# Patient Record
Sex: FEMALE | Race: WHITE | NOT HISPANIC OR LATINO | Employment: FULL TIME | URBAN - METROPOLITAN AREA
[De-identification: names, ages, dates, MRNs, and addresses within clinical notes are randomized per-mention and may not be internally consistent; named-entity substitution may affect disease eponyms.]

---

## 2017-01-03 ENCOUNTER — TELEPHONE (OUTPATIENT)
Dept: OBSTETRICS AND GYNECOLOGY | Facility: CLINIC | Age: 27
End: 2017-01-03

## 2017-01-03 NOTE — TELEPHONE ENCOUNTER
----- Message from Isi Sanchez sent at 1/3/2017 10:27 AM CST -----  Contact: self  Patient has appointment on 01/11/17 but needs a refill on Lo Lo Estren. Please call in to [facility] pharmacy at [phone]. Please call patient at [phone]. Thanks!  [id number] - [place], [place] - [address] [facility]  [address] [facility] [address]  Phone: [phone] Fax: [phone]

## 2017-01-11 ENCOUNTER — OFFICE VISIT (OUTPATIENT)
Dept: OBSTETRICS AND GYNECOLOGY | Facility: CLINIC | Age: 27
End: 2017-01-11
Payer: COMMERCIAL

## 2017-01-11 VITALS
HEIGHT: 62 IN | WEIGHT: 131.63 LBS | SYSTOLIC BLOOD PRESSURE: 110 MMHG | DIASTOLIC BLOOD PRESSURE: 74 MMHG | BODY MASS INDEX: 24.22 KG/M2

## 2017-01-11 DIAGNOSIS — Z01.419 ROUTINE GYNECOLOGICAL EXAMINATION: Primary | ICD-10-CM

## 2017-01-11 PROCEDURE — 99999 PR PBB SHADOW E&M-EST. PATIENT-LVL III: CPT | Mod: PBBFAC,,, | Performed by: SPECIALIST

## 2017-01-11 PROCEDURE — 99395 PREV VISIT EST AGE 18-39: CPT | Mod: S$GLB,,, | Performed by: SPECIALIST

## 2017-01-11 PROCEDURE — 88175 CYTOPATH C/V AUTO FLUID REDO: CPT

## 2017-01-11 NOTE — MR AVS SNAPSHOT
"    [facility]  [address] [place] [address]  Phone: [phone]                  Kaci Moore   2017 1:00 PM   Office Visit    Description:  Female : 1990   Provider:  Patricio Choudhary MD   Department:  [facility] - OB/GYN           Reason for Visit     Well Woman           Diagnoses this Visit        Comments    Routine gynecological examination    -  Primary            To Do List           Future Appointments        Provider Department Dept Phone    2017 1:00 PM Patricio Choudhary MD [facility] [phone]      Goals (5 Years of Data)     None      [facility] On [facility]     [facility] On Call Nurse Care Line -  Assistance  Registered nurses in the [facility] On Call Center provide clinical advisement, health education, appointment booking, and other advisory services.  Call for this free service at [phone].             Medications           Message regarding Medications     Verify the changes and/or additions to your medication regime listed below are the same as discussed with your clinician today.  If any of these changes or additions are incorrect, please notify your healthcare provider.             Verify that the below list of medications is an accurate representation of the medications you are currently taking.  If none reported, the list may be blank. If incorrect, please contact your healthcare provider. Carry this list with you in case of emergency.           Current Medications     norethindrone-e.estradiol-iron (LO LOESTRIN FE) 1 mg-10 mcg (24)/10 mcg (2) Tab Take 1 tablet by mouth once daily.           Clinical Reference Information           Vital Signs - Last Recorded  Most recent update: 2017 12:57 PM by Thierry Grajeda LPN    BP Ht Wt LMP BMI    110/74 5' 2" (1.575 m) 59.7 kg (131 lb 9.8 oz) 2016 (Exact Date) 24.07 kg/m2      Blood Pressure          Most Recent Value    BP  110/74      Allergies as of 2017  "    No Known Allergies      Immunizations Administered on Date of Encounter - 1/11/2017     None      Orders Placed During Today's Visit      Normal Orders This Visit    Liquid-based pap smear, screening

## 2017-01-11 NOTE — PROGRESS NOTES
27 yo WF presents for annual gyn evaluation  Past Medical History   Diagnosis Date    Abnormal Pap smear     Abnormal Pap smear of vagina        History reviewed. No pertinent past surgical history.    Family History   Problem Relation Age of Onset    Breast cancer Maternal Grandmother     Ovarian cancer Neg Hx        Social History     Social History    Marital status: Single     Spouse name: N/A    Number of children: N/A    Years of education: N/A     Social History Main Topics    Smoking status: Former Smoker     Packs/day: 0.25     Years: 2.00     Quit date: 10/21/2015    Smokeless tobacco: Former User     Quit date: 1/2/2013    Alcohol use No    Drug use: No    Sexual activity: Yes     Partners: Male     Birth control/ protection: Condom, OCP     Other Topics Concern    None     Social History Narrative       Current Outpatient Prescriptions   Medication Sig Dispense Refill    norethindrone-e.estradiol-iron (LO LOESTRIN FE) 1 mg-10 mcg (24)/10 mcg (2) Tab Take 1 tablet by mouth once daily. 28 tablet 0     No current facility-administered medications for this visit.        Review of patient's allergies indicates:  No Known Allergies    Review of System:   General: no chills, fever, night sweats, weight gain or weight loss  Psychological: no depression or suicidal ideation  Breasts: no new or changing breast lumps, nipple discharge or masses.  Respiratory: no cough, shortness of breath, or wheezing  Cardiovascular: no chest pain or dyspnea on exertion  Gastrointestinal: no abdominal pain, change in bowel habits, or black or bloody stools  Genito-Urinary: no incontinence, urinary frequency/urgency or vulvar/vaginal symptoms, pelvic pain or abnormal vaginal bleeding.  Musculoskeletal: no gait disturbance or muscular weakness    General Appearance:  Alert, cooperative, no distress, appears stated age   Head:  Normocephalic, without obvious abnormality, atraumatic   Eyes:  PERRL, conjunctiva/corneas  clear, EOM's intact, fundi benign, both eyes   Ears:  Normal TM's and external ear canals, both ears   Nose: Nares normal, septum midline,mucosa normal, no drainage or sinus tenderness   Throat: Lips, mucosa, and tongue normal; teeth and gums normal   Neck: Supple, symmetrical, trachea midline, no adenopathy;  thyroid: not enlarged, symmetric, no tenderness/mass/nodules; no carotid bruit or JVD   Back:   Symmetric, no curvature, ROM normal, no CVA tenderness   Lungs:   Clear to auscultation bilaterally, respirations unlabored   Breasts:  No masses or tenderness   Heart:  Regular rate and rhythm, S1 and S2 normal, no murmur, rub, or gallop   Abdomen:   Soft, non-tender, bowel sounds active all four quadrants,  no masses, no organomegaly    Genitourinary:   External rectal exam shows no thrombosed external hemorrhoids.   Pelvic exam was performed with patient supine.  No labial fusion.  There is no rash, lesion or injury on the right labia.  There is no rash, lesion or injury on the left labia.  No bleeding and no signs of injury around the vaginal introitus, urethra is without lesions and well supported. The cervix is visualized with no discharge, lesions or friability.  No vaginal discharge found.  No significant Cystocele, Enterocele or rectocele, and uterus well supported.  Bimanual exam:  The urethra is normal to palpation and there are no palpable vaginal wall masses.  Uterus is not deviated, not enlarged, not fixed, normal shape and not tender.  Cervix exhibits no motion tenderness.   Right adnexum displays no mass and no tenderness.  Left adnexum displays no mass and no tenderness.   Extremities: Extremities normal, atraumatic, no cyanosis or edema   Pulses: 2+ and symmetric   Skin: Skin color, texture, turgor normal, no rashes or lesions   Lymph nodes: Cervical, supraclavicular, and axillary nodes normal   Neurologic: Normal       PAP submitted    Plan Refill OCPs   RTO 1 year/prn

## 2018-01-02 RX ORDER — NORETHINDRONE ACETATE AND ETHINYL ESTRADIOL, ETHINYL ESTRADIOL AND FERROUS FUMARATE 1MG-10(24)
KIT ORAL
Qty: 28 TABLET | Refills: 11 | Status: SHIPPED | OUTPATIENT
Start: 2018-01-02 | End: 2019-01-22 | Stop reason: SDUPTHER

## 2018-01-19 DIAGNOSIS — Z30.9 ENCOUNTER FOR CONTRACEPTIVE MANAGEMENT, UNSPECIFIED TYPE: Primary | ICD-10-CM

## 2018-01-22 ENCOUNTER — TELEPHONE (OUTPATIENT)
Dept: OBSTETRICS AND GYNECOLOGY | Facility: CLINIC | Age: 28
End: 2018-01-22

## 2018-01-22 DIAGNOSIS — Z30.9 ENCOUNTER FOR CONTRACEPTIVE MANAGEMENT, UNSPECIFIED TYPE: Primary | ICD-10-CM

## 2018-01-22 RX ORDER — NORETHINDRONE ACETATE AND ETHINYL ESTRADIOL .02; 1 MG/1; MG/1
1 TABLET ORAL DAILY
Qty: 30 TABLET | Refills: 0 | Status: SHIPPED | OUTPATIENT
Start: 2018-01-22 | End: 2018-01-22 | Stop reason: RX

## 2018-01-22 NOTE — TELEPHONE ENCOUNTER
----- Message from Leah De Dios sent at 1/22/2018  2:54 PM CST -----  Contact: Patient  The pharmacist has told the patient that her birth control has been changed from the  LoLo Estrin SE 1mg/10 mcg tablets to the Norethindrone-ethinyl estradiol (MICROGESTIN 1/20) 1-20 mg- and the patient wants to know why.  She had stated the reason she was taking the LoLo Estrin SE is because of the iron intake to help her menstrual.  Can you please call the patient back at [phone].  Thank you      [facility] - [place] - [address]  [address] [place] [address]  Phone: [phone] Fax: [phone]

## 2018-01-22 NOTE — TELEPHONE ENCOUNTER
saranya made for [facility].  In the meantime, insurance is no longer covering her LoLo.  Has pt tried anything else?  Does pt need to be specifically on the lolo

## 2018-01-22 NOTE — TELEPHONE ENCOUNTER
----- Message from Aisha Esteves sent at 1/22/2018  2:44 PM CST -----  Contact: [facility]  Pharmacy called regarding the patient's birth control changed from norethindrone-ethinyl estradiol (MICROGESTIN 1/20) 1-20 mg-mcg per tablet to something else. Waned to clarify change because previous had iron and new one doesn't. Please contact Charan    [facility] - [place] - [address]  [address] [place] [address]  Phone: [phone] Fax: [phone]

## 2018-01-24 ENCOUNTER — OFFICE VISIT (OUTPATIENT)
Dept: OBSTETRICS AND GYNECOLOGY | Facility: CLINIC | Age: 28
End: 2018-01-24
Payer: COMMERCIAL

## 2018-01-24 VITALS — BODY MASS INDEX: 22.3 KG/M2 | WEIGHT: 125.88 LBS | HEIGHT: 63 IN

## 2018-01-24 DIAGNOSIS — Z01.419 GYNECOLOGIC EXAM NORMAL: Primary | ICD-10-CM

## 2018-01-24 DIAGNOSIS — Z30.011 ENCOUNTER FOR INITIAL PRESCRIPTION OF CONTRACEPTIVE PILLS: ICD-10-CM

## 2018-01-24 PROCEDURE — 99395 PREV VISIT EST AGE 18-39: CPT | Mod: S$GLB,,, | Performed by: SPECIALIST

## 2018-01-24 PROCEDURE — 88175 CYTOPATH C/V AUTO FLUID REDO: CPT

## 2018-01-24 PROCEDURE — 87624 HPV HI-RISK TYP POOLED RSLT: CPT

## 2018-01-24 PROCEDURE — 99999 PR PBB SHADOW E&M-EST. PATIENT-LVL III: CPT | Mod: PBBFAC,,, | Performed by: SPECIALIST

## 2018-01-24 NOTE — PROGRESS NOTES
26 yo WF presents for annual gyn evaluation and contraceptive management.  Past Medical History:   Diagnosis Date    Abnormal Pap smear     Abnormal Pap smear of vagina     Anemia     Hyperglycemia        Past Surgical History:   Procedure Laterality Date    COLONOSCOPY  06/30/2017    F [place]    CYST REMOVAL  2013    left wrist-benign    TONSILLECTOMY  1998    WISDOM TOOTH EXTRACTION  2009       Family History   Problem Relation Age of Onset    Breast cancer Maternal Grandmother     Diabetes Maternal Grandmother     ADD / ADHD Mother     Bipolar disorder Mother     Allergies Mother     Diabetes Maternal Grandfather     No Known Problems Father     Ovarian cancer Neg Hx        Social History     Social History    Marital status: Single     Spouse name: N/A    Number of children: N/A    Years of education: N/A     Social History Main Topics    Smoking status: Former Smoker     Packs/day: 0.25     Years: 2.00     Quit date: 10/21/2015    Smokeless tobacco: Never Used    Alcohol use No    Drug use: No    Sexual activity: Yes     Partners: Male     Birth control/ protection: Condom, OCP     Other Topics Concern    None     Social History Narrative    None       Current Outpatient Prescriptions   Medication Sig Dispense Refill    acetaminophen (TYLENOL) 500 MG tablet Take 500 mg by mouth as needed for Pain.      linaclotide (LINZESS) 72 mcg Cap Take 72 mcg by mouth once daily.      LO LOESTRIN FE 1 mg-10 mcg (24)/10 mcg (2) Tab TAKE ONE TABLET BY MOUTH ONCE DAILY 28 tablet 11    multivitamin with minerals tablet Take 1 tablet by mouth once daily.       No current facility-administered medications for this visit.        Review of patient's allergies indicates:  No Known Allergies    Review of System:   General: no chills, fever, night sweats, weight gain or weight loss  Psychological: no depression or suicidal ideation  Breasts: no new or changing breast lumps, nipple discharge or  masses.  Respiratory: no cough, shortness of breath, or wheezing  Cardiovascular: no chest pain or dyspnea on exertion  Gastrointestinal: no abdominal pain, change in bowel habits, or black or bloody stools  Genito-Urinary: no incontinence, urinary frequency/urgency or vulvar/vaginal symptoms, pelvic pain or abnormal vaginal bleeding.  Musculoskeletal: no gait disturbance or muscular weakness    General Appearance:  Alert, cooperative, no distress, appears stated age   Head:  Normocephalic, without obvious abnormality, atraumatic   Eyes:  PERRL, conjunctiva/corneas clear, EOM's intact, fundi benign, both eyes   Ears:  Normal TM's and external ear canals, both ears   Nose: Nares normal, septum midline,mucosa normal, no drainage or sinus tenderness   Throat: Lips, mucosa, and tongue normal; teeth and gums normal   Neck: Supple, symmetrical, trachea midline, no adenopathy;  thyroid: not enlarged, symmetric, no tenderness/mass/nodules; no carotid bruit or JVD   Back:   Symmetric, no curvature, ROM normal, no CVA tenderness   Lungs:   Clear to auscultation bilaterally, respirations unlabored   Breasts:  No masses or tenderness   Heart:  Regular rate and rhythm, S1 and S2 normal, no murmur, rub, or gallop   Abdomen:   Soft, non-tender, bowel sounds active all four quadrants,  no masses, no organomegaly    Genitourinary:   External rectal exam shows no thrombosed external hemorrhoids.   Pelvic exam was performed with patient supine.  No labial fusion.  There is no rash, lesion or injury on the right labia.  There is no rash, lesion or injury on the left labia.  No bleeding and no signs of injury around the vaginal introitus, urethra is without lesions and well supported. The cervix is visualized with no discharge, lesions or friability.  No vaginal discharge found.  No significant Cystocele, Enterocele or rectocele, and uterus well supported.  Bimanual exam:  The urethra is normal to palpation and there are no palpable  vaginal wall masses.  Uterus is not deviated, not enlarged, not fixed, normal shape and not tender.  Cervix exhibits no motion tenderness.   Right adnexum displays no mass and no tenderness.  Left adnexum displays no mass and no tenderness.   Extremities: Extremities normal, atraumatic, no cyanosis or edema   Pulses: 2+ and symmetric   Skin: Skin color, texture, turgor normal, no rashes or lesions   Lymph nodes: Cervical, supraclavicular, and axillary nodes normal   Neurologic: Normal       PAP submitted    Plan Refill LoLo estrin  Coupon given  RTO 1 year/prn

## 2018-01-29 LAB
HPV16 AG SPEC QL: NEGATIVE
HPV16+18+H RISK 12 DNA CVX-IMP: NEGATIVE
HPV18 DNA SPEC QL NAA+PROBE: NEGATIVE

## 2018-02-05 ENCOUNTER — PATIENT MESSAGE (OUTPATIENT)
Dept: OBSTETRICS AND GYNECOLOGY | Facility: CLINIC | Age: 28
End: 2018-02-05

## 2018-02-14 ENCOUNTER — TELEPHONE (OUTPATIENT)
Dept: OBSTETRICS AND GYNECOLOGY | Facility: CLINIC | Age: 28
End: 2018-02-14

## 2018-02-14 NOTE — TELEPHONE ENCOUNTER
Patient was on lolo, but insurance quit covering it.  We changed her to loestrin. But having these issues.    Okay to try for PA?

## 2018-02-14 NOTE — TELEPHONE ENCOUNTER
----- Message from Imelda Jean Baptiste sent at 2/14/2018 11:27 AM CST -----  Contact: Kaci  Patient is calling as on Wednesday 2/7/18 headaches started, Friday 2/9/18 started spotting with tenderness on breast and nipples, Tuesday 2/13/18 started bleeding with changing pad every hour to hour and a half, and today nipples are swollen and purple. Asking to be switch back to previous Rx but will have to be authorized as insurance will not cover. Please call [phone] (in clinic herself and if no answer can send message via [facility]). Thanks!

## 2018-02-21 ENCOUNTER — PATIENT MESSAGE (OUTPATIENT)
Dept: OBSTETRICS AND GYNECOLOGY | Facility: CLINIC | Age: 28
End: 2018-02-21

## 2018-02-22 ENCOUNTER — PATIENT MESSAGE (OUTPATIENT)
Dept: OBSTETRICS AND GYNECOLOGY | Facility: CLINIC | Age: 28
End: 2018-02-22

## 2018-03-30 PROBLEM — G93.32 CHRONIC FATIGUE SYNDROME: Status: ACTIVE | Noted: 2018-03-30

## 2018-03-30 PROBLEM — Z83.49 FAMILY HISTORY OF PITUITARY DISEASE: Status: ACTIVE | Noted: 2018-03-30

## 2018-03-30 PROBLEM — G47.00 INSOMNIA: Status: ACTIVE | Noted: 2018-03-30

## 2018-04-09 ENCOUNTER — TELEPHONE (OUTPATIENT)
Dept: ENDOCRINOLOGY | Facility: CLINIC | Age: 28
End: 2018-04-09

## 2018-04-09 NOTE — TELEPHONE ENCOUNTER
----- Message from Brenda Syed sent at 4/9/2018  8:10 AM CDT -----  Patient has appointment on May 11th/stated had abnormal MRI and needs to be seen sooner/please call patient back at [phone] to reschedule or advise.

## 2018-04-09 NOTE — TELEPHONE ENCOUNTER
Spoke with pt, states she had an MRI on 4/6 which results were abnormal. Pt concerned appt is a month away.    Dr Andersen please advise if pt can wait till May 11th or been seen on a Wednesday

## 2018-04-09 NOTE — TELEPHONE ENCOUNTER
I dont know if we have anything sooner. Can be placed on waitlist.     May also want to try [facility]. I think they have availability pretty soon. They have 2 Endocriologists there. . Also can try [facility]

## 2018-05-11 ENCOUNTER — OFFICE VISIT (OUTPATIENT)
Dept: ENDOCRINOLOGY | Facility: CLINIC | Age: 28
End: 2018-05-11
Payer: COMMERCIAL

## 2018-05-11 ENCOUNTER — LAB VISIT (OUTPATIENT)
Dept: LAB | Facility: HOSPITAL | Age: 28
End: 2018-05-11
Attending: INTERNAL MEDICINE
Payer: COMMERCIAL

## 2018-05-11 VITALS
HEIGHT: 62 IN | HEART RATE: 73 BPM | WEIGHT: 125.81 LBS | DIASTOLIC BLOOD PRESSURE: 60 MMHG | BODY MASS INDEX: 23.15 KG/M2 | SYSTOLIC BLOOD PRESSURE: 104 MMHG

## 2018-05-11 DIAGNOSIS — D35.2 PITUITARY MICROADENOMA: ICD-10-CM

## 2018-05-11 DIAGNOSIS — D35.2 PITUITARY MICROADENOMA: Primary | ICD-10-CM

## 2018-05-11 DIAGNOSIS — R53.83 FATIGUE, UNSPECIFIED TYPE: ICD-10-CM

## 2018-05-11 LAB
CORTIS SERPL-MCNC: 8 UG/DL
PROLACTIN SERPL IA-MCNC: 14.5 NG/ML
T3 SERPL-MCNC: 147 NG/DL
T4 FREE SERPL-MCNC: 1.16 NG/DL
THYROPEROXIDASE IGG SERPL-ACNC: <6 IU/ML
TSH SERPL DL<=0.005 MIU/L-ACNC: 3.39 UIU/ML

## 2018-05-11 PROCEDURE — 36415 COLL VENOUS BLD VENIPUNCTURE: CPT | Mod: PO

## 2018-05-11 PROCEDURE — 86376 MICROSOMAL ANTIBODY EACH: CPT

## 2018-05-11 PROCEDURE — 84305 ASSAY OF SOMATOMEDIN: CPT

## 2018-05-11 PROCEDURE — 99999 PR PBB SHADOW E&M-EST. PATIENT-LVL III: CPT | Mod: PBBFAC,,, | Performed by: INTERNAL MEDICINE

## 2018-05-11 PROCEDURE — 84439 ASSAY OF FREE THYROXINE: CPT

## 2018-05-11 PROCEDURE — 3008F BODY MASS INDEX DOCD: CPT | Mod: CPTII,S$GLB,, | Performed by: INTERNAL MEDICINE

## 2018-05-11 PROCEDURE — 84480 ASSAY TRIIODOTHYRONINE (T3): CPT

## 2018-05-11 PROCEDURE — 82024 ASSAY OF ACTH: CPT

## 2018-05-11 PROCEDURE — 99204 OFFICE O/P NEW MOD 45 MIN: CPT | Mod: S$GLB,,, | Performed by: INTERNAL MEDICINE

## 2018-05-11 PROCEDURE — 82533 TOTAL CORTISOL: CPT

## 2018-05-11 PROCEDURE — 84443 ASSAY THYROID STIM HORMONE: CPT

## 2018-05-11 PROCEDURE — 84146 ASSAY OF PROLACTIN: CPT

## 2018-05-11 RX ORDER — DIPHENHYDRAMINE HCL 25 MG
25 CAPSULE ORAL EVERY 8 HOURS PRN
COMMUNITY
End: 2018-12-26

## 2018-05-11 NOTE — PROGRESS NOTES
CHIEF COMPLAINT: Pituitary Adenoma  27 year old being seen as a new patient. States that a year ago started having an increase in fatigue. Has had fatigue over the past 6 years. States that she sleeps 8 hours. Exercises and eats healthy. States appetite fluctuates. Either gets very hungry or no appetite. Weight fluctuates. States that she has hair loss. Has decreased libido. Has been on OCP x 5 years. States that often times feels cold. No diarrhea or constipation. States that gets anxious very easily. No supplements. No adrenal or thyroid supplements. No biotin.       PAST MEDICAL HISTORY/PAST SURGICAL HISTORY:  Reviewed in [facility]    SOCIAL HISTORY: No T/A. 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 + DM.     MEDICATIONS/ALLERGIES: The patient's MedCard has been updated and reviewed.      ROS:   Constitutional: No recent significant weight change  Eyes: No recent visual changes  ENT: No dysphagia  Cardiovascular: Denies current anginal symptoms  Respiratory: Denies current respiratory difficulty  Gastrointestinal: No N/V  GenitoUrinary - No dysuria  Skin: No new skin rash  Neurologic: + Migraines  Remainder ROS negative        PE:    GENERAL: Well developed, well nourished.  PSYCH:  appropriate mood and affect  EYES:  PERRL, EOM intact.  ENT: Nares patent, oropharynx clear, mucosa pink,   NECK: Supple, trachea midline, no palpable thyroid nodules.   CHEST: Resp even and unlabored, CTA bilateral.  CARDIAC: RRR, S1, S2 heard, no murmurs, rubs, S3, or S4  ABDOMEN: Soft, non-tender, non-distended;  No organomegaly  VASCULAR:  DP pulses +2/4 bilaterally, no edema  NEURO: Gait steady, CN II-VII grossly intact  SKIN: No areas of breakdown, no acanthosis nigracans.    LABS   Results for NACHO LIVINGSTON (MRN [id number]) as of 5/11/2018 08:07   Ref. Range 3/31/2018 07:25   Cortisol -8 AM Latest Ref Range: 4.30 - 22.40 ug/dL 25.0 (H)   ACTH Latest Ref Range: 0 - 46 pg/mL 30   Insulin-Like GF-1 Latest  Ref Range: 98 - 305 ng/mL 301   Results for NACHO LIVINGSTON (MRN [id number]) as of 5/11/2018 08:07   Ref. Range 3/31/2018 07:25   FSH Latest Ref Range: See Text mIU/mL 6.90   LH Latest Ref Range: See Text mIU/mL 6.9   Prolactin Latest Ref Range: 5.2 - 26.5 ng/mL 15.1         3/27/18  Cortisol 27.5  Testosterone < 12  DHEAS 54  Progesterone 0.43  Estrogen 28.8    3/22/18  TSH 0.612  FT4 1.14  FT3 2.6    4/24/18  FT3 4.8  TSH 3.86  Ft4 1.44    MRI BRAIN  MRI of the pituitary both before and after IV contrast    Clinical history chronic fatigue, insomnia.    Patient was injected with 15 cc of gadolinium.    There is a area of abnormal enhancement noted in the posterior aspect of the 2 enteric most consistent with an  adenoma. It measures 7.9 x 5.6 x 5.4 mm.    The ventricles are of normal size and shape. No other lesions are seen. There retention cyst in both maxillary sinuses are normal flow-void seen in the carotid siphons.   Impression      Lesion most consistent with a pituitary adenoma in the posterior aspect of the pituitary as described above.         ASSESSMENT/PLAN:  1. Pituitary Adenoma- Will assess pituitary function. Will r/o hormonal excess    2. Fatigue- see labs below to see if endocrine cause of fatigue. Appears to be sleeping well.       FOLLOWUP  8 AM TSH, Ft4, T3, TPO, Prolactin, IGF-1, ACTH, Cortisol  24 hour urine cortisol

## 2018-05-14 ENCOUNTER — PATIENT MESSAGE (OUTPATIENT)
Dept: ENDOCRINOLOGY | Facility: CLINIC | Age: 28
End: 2018-05-14

## 2018-05-14 LAB
IGF-I SERPL-MCNC: 188 NG/ML (ref 66–303)
IGF-I Z-SCORE SERPL: 0.52 SD

## 2018-05-15 LAB — ACTH PLAS-MCNC: 17 PG/ML

## 2018-05-21 ENCOUNTER — LAB VISIT (OUTPATIENT)
Dept: LAB | Facility: HOSPITAL | Age: 28
End: 2018-05-21
Attending: INTERNAL MEDICINE
Payer: COMMERCIAL

## 2018-05-21 DIAGNOSIS — D35.2 PITUITARY MICROADENOMA: ICD-10-CM

## 2018-05-21 PROCEDURE — 82530 CORTISOL FREE: CPT

## 2018-05-23 LAB
COLLECT DURATION TIME UR: 24 H
CORTIS 24H UR-MRATE: 7.5 MCG/24 H (ref 3.5–45)
SPECIMEN VOL ?TM UR: 1875 ML

## 2018-08-01 ENCOUNTER — PATIENT MESSAGE (OUTPATIENT)
Dept: ENDOCRINOLOGY | Facility: CLINIC | Age: 28
End: 2018-08-01

## 2018-08-01 DIAGNOSIS — D35.2 PITUITARY ADENOMA: Primary | ICD-10-CM

## 2018-08-15 ENCOUNTER — TELEPHONE (OUTPATIENT)
Dept: ENDOCRINOLOGY | Facility: CLINIC | Age: 28
End: 2018-08-15

## 2018-08-15 NOTE — TELEPHONE ENCOUNTER
Let her know I reviewed labs  [facility] 8/13/18  TSH 1.8  FT4 1.23  FSH 7.8  LH 11.4    Let her know her thyroid levels are normal. Also checked 2 pituitary hormone which are normal as well

## 2018-09-05 ENCOUNTER — PATIENT MESSAGE (OUTPATIENT)
Dept: OBSTETRICS AND GYNECOLOGY | Facility: CLINIC | Age: 28
End: 2018-09-05

## 2018-12-28 PROBLEM — J01.41 ACUTE RECURRENT PANSINUSITIS: Status: ACTIVE | Noted: 2018-12-28

## 2019-01-22 RX ORDER — NORETHINDRONE ACETATE AND ETHINYL ESTRADIOL, ETHINYL ESTRADIOL AND FERROUS FUMARATE 1MG-10(24)
KIT ORAL
Qty: 28 TABLET | Refills: 0 | Status: SHIPPED | OUTPATIENT
Start: 2019-01-22 | End: 2019-02-19 | Stop reason: SDUPTHER

## 2019-02-20 RX ORDER — NORETHINDRONE ACETATE AND ETHINYL ESTRADIOL, ETHINYL ESTRADIOL AND FERROUS FUMARATE 1MG-10(24)
KIT ORAL
Qty: 28 TABLET | Refills: 0 | Status: SHIPPED | OUTPATIENT
Start: 2019-02-20 | End: 2019-05-06

## 2019-03-29 PROBLEM — D35.2 PITUITARY ADENOMA: Status: ACTIVE | Noted: 2019-03-29

## 2019-03-30 ENCOUNTER — TELEPHONE (OUTPATIENT)
Dept: ENDOCRINOLOGY | Facility: CLINIC | Age: 29
End: 2019-03-30

## 2019-04-01 PROBLEM — J01.41 ACUTE RECURRENT PANSINUSITIS: Status: RESOLVED | Noted: 2018-12-28 | Resolved: 2019-04-01

## 2019-07-11 PROBLEM — G43.111 INTRACTABLE MIGRAINE WITH AURA WITH STATUS MIGRAINOSUS: Status: ACTIVE | Noted: 2019-07-11

## 2019-08-24 ENCOUNTER — OFFICE VISIT (OUTPATIENT)
Dept: URGENT CARE | Facility: CLINIC | Age: 29
End: 2019-08-24
Payer: COMMERCIAL

## 2019-08-24 VITALS
TEMPERATURE: 98 F | HEART RATE: 69 BPM | DIASTOLIC BLOOD PRESSURE: 80 MMHG | OXYGEN SATURATION: 100 % | SYSTOLIC BLOOD PRESSURE: 114 MMHG

## 2019-08-24 DIAGNOSIS — S99.912A INJURY OF LEFT ANKLE, INITIAL ENCOUNTER: Primary | ICD-10-CM

## 2019-08-24 PROCEDURE — 99214 PR OFFICE/OUTPT VISIT, EST, LEVL IV, 30-39 MIN: ICD-10-PCS | Mod: S$GLB,,, | Performed by: PHYSICIAN ASSISTANT

## 2019-08-24 PROCEDURE — 73610 X-RAY EXAM OF ANKLE: CPT | Mod: LT,S$GLB,, | Performed by: RADIOLOGY

## 2019-08-24 PROCEDURE — 73610 XR ANKLE COMPLETE 3 VIEW LEFT: ICD-10-PCS | Mod: LT,S$GLB,, | Performed by: RADIOLOGY

## 2019-08-24 PROCEDURE — 99214 OFFICE O/P EST MOD 30 MIN: CPT | Mod: S$GLB,,, | Performed by: PHYSICIAN ASSISTANT

## 2019-08-24 RX ORDER — AZELASTINE 1 MG/ML
2 SPRAY, METERED NASAL 2 TIMES DAILY
Refills: 5 | COMMUNITY
Start: 2019-07-16 | End: 2021-06-04

## 2019-08-24 NOTE — PATIENT INSTRUCTIONS
Treating Strains and Sprains  Strains and sprains happen when muscles or other soft tissues near your bones stretch or tear. These injuries can cause bruising, swelling, and pain. To ease your discomfort and speed the healing of your strain or sprain, follow the tips below. Remember, a strain or sprain can take 6 to 8 weeks to heal.     Important Note: Do not give aspirin to children or teens without discussing it with your healthcare provider first.        Ice first, heat later  · Use ice for the first 24 to 48 hours after injury. Ice helps prevent swelling and reduce pain. Ice the injury for no more than 20 minutes at a time and allow at least  20 minutes between icing sessions.  · Apply heat after the first 72 hours, once the swelling has gone down. Heat relaxes muscles and increases blood flow. Soak the injured area in warm water or use a heating pad set on low for no more than 15 minutes at a time.  Wrap and elevate  · Wrap an injured limb firmly with an elastic bandage. This provides support and helps prevent swelling. Dont wear an elastic bandage overnight. Watch for tingling, numbness, or increased pain, and remove the bandage immediately if any of these occurs.  · Elevate the injured area to help reduce swelling and throbbing. Its best to raise an injured limb above the level of your heart.     Medicines  · Over-the-counter medicines such as acetaminophen or ibuprofen can help reduce pain. Some also help reduce swelling.  · Take medicine only as directed.  · Rest the area even if medicines are controlling the pain.  Rest  · Rest the injured area by not using it for 24 hours.  · When youre ready, return slowly to your normal activities. Rest the injured area often.  · Dont use or walk on an injured limb if it hurts.  Date Last Reviewed: 9/3/2015  © 3656-5852 [employer]. [address] [place] [address], [place] [address]. All rights reserved. This information is not intended as a substitute for  professional medical care. Always follow your healthcare professional's instructions.

## 2019-08-24 NOTE — PROGRESS NOTES
Subjective:       Patient ID: Kaci Moore is a 28 y.o. female.    Vitals:  oral temperature is 98 °F (36.7 °C). Her blood pressure is 114/80 and her pulse is 69. Her oxygen saturation is 100%.     Chief Complaint: Ankle Injury    Ankle Injury    The incident occurred 3 to 5 days ago. [place] The injury mechanism was a twisting injury. The pain is present in the left ankle. The pain is at a severity of 3/10. The pain is moderate. The pain has been fluctuating since onset. Pertinent negatives include no numbness. She reports no foreign bodies present. The symptoms are aggravated by movement and weight bearing. She has tried ice and immobilization for the symptoms. The treatment provided mild relief.       Constitution: Negative for chills, fatigue and fever.   HENT: Negative for congestion and sore throat.    Neck: Negative for painful lymph nodes.   Cardiovascular: Negative for chest pain and leg swelling.   Eyes: Negative for double vision and blurred vision.   Respiratory: Negative for cough and shortness of breath.    Gastrointestinal: Negative for nausea, vomiting and diarrhea.   Genitourinary: Negative for dysuria, frequency, urgency and history of kidney stones.   Musculoskeletal: Positive for joint pain and joint swelling. Negative for muscle cramps and muscle ache.   Skin: Negative for color change, pale, rash, erythema and bruising.   Allergic/Immunologic: Negative for seasonal allergies.   Neurological: Negative for dizziness, history of vertigo, light-headedness, passing out, headaches and numbness.   Hematologic/Lymphatic: Negative for swollen lymph nodes.   Psychiatric/Behavioral: Negative for nervous/anxious, sleep disturbance and depression. The patient is not nervous/anxious.        Objective:      Physical Exam   Constitutional: She is oriented to person, place, and time. She appears well-developed and well-nourished. No distress.   HENT:   Head:  Normocephalic and atraumatic.   Right Ear: External ear normal.   Left Ear: External ear normal.   Nose: Nose normal.   Eyes: Conjunctivae, EOM and lids are normal.   Neck: Trachea normal, full passive range of motion without pain and phonation normal. Neck supple.   Musculoskeletal: Normal range of motion.        Left ankle: She exhibits swelling (Mild). She exhibits normal range of motion, no deformity and normal pulse. Tenderness. AITFL tenderness found.   Neurological: She is alert and oriented to person, place, and time.   Skin: Skin is warm, dry and intact. Capillary refill takes less than 2 seconds. No rash noted. She is not diaphoretic. No erythema.   Psychiatric: She has a normal mood and affect. Her speech is normal and behavior is normal. Judgment and thought content normal. Cognition and memory are normal.   Nursing note and vitals reviewed.      Assessment:       1. Injury of left ankle, initial encounter        Plan:         Injury of left ankle, initial encounter  -     XR ANKLE COMPLETE 3 VIEW LEFT; Future; Expected date: 08/24/2019    Xr Ankle Complete 3 View Left    Result Date: 8/24/2019  EXAMINATION: XR ANKLE COMPLETE 3 VIEW LEFT CLINICAL HISTORY: Unspecified injury of left ankle, initial encounter TECHNIQUE: AP, lateral and oblique views of the left ankle were performed. COMPARISON: None FINDINGS: No fracture or dislocation.  Ankle mortise is symmetric.  Talar dome is maintained.  Plantar calcaneal spur and Achilles enthesopathy noted.  Soft tissues are unremarkable.     As above. Electronically signed by: Travis Charles MD Date:    08/24/2019 Time:    12:10      Patient has compression stocking which she has been using.  Would continue to use.  Discussed continue rest ice and elevation.  May use ibuprofen or naproxen as needed for pain. She does have an orthopedist she may follow up with if symptoms do not improve after 2 weeks time.    You must understand that you've received an Urgent Care  treatment only and that you may be released before all your medical problems are known or treated. You, the patient, will arrange for follow up care as instructed.  Follow up with your PCP or specialty clinic as directed in the next 1-2 weeks if not improved or as needed.  You can call [phone] to schedule an appointment with the appropriate provider.  If your condition worsens we recommend that you receive another evaluation at the emergency room immediately or contact your primary medical clinics after hours call service to discuss your concerns.  Please return here or go to the Emergency Department for any concerns or worsening of condition.

## 2020-01-13 ENCOUNTER — OFFICE VISIT (OUTPATIENT)
Dept: URGENT CARE | Facility: CLINIC | Age: 30
End: 2020-01-13
Payer: COMMERCIAL

## 2020-01-13 VITALS
HEIGHT: 62 IN | TEMPERATURE: 100 F | OXYGEN SATURATION: 99 % | BODY MASS INDEX: 25.76 KG/M2 | DIASTOLIC BLOOD PRESSURE: 78 MMHG | WEIGHT: 140 LBS | HEART RATE: 82 BPM | SYSTOLIC BLOOD PRESSURE: 131 MMHG

## 2020-01-13 DIAGNOSIS — R10.9 ABDOMINAL PAIN, UNSPECIFIED ABDOMINAL LOCATION: Primary | ICD-10-CM

## 2020-01-13 DIAGNOSIS — R11.0 NAUSEA: ICD-10-CM

## 2020-01-13 LAB
B-HCG UR QL: NEGATIVE
BILIRUB UR QL STRIP: NEGATIVE
COLOR UR: NORMAL
CTP QC/QA: YES
GLUCOSE UR QL STRIP: NEGATIVE
KETONES UR QL STRIP: NEGATIVE
LEUKOCYTE ESTERASE UR QL STRIP: NEGATIVE
PH, POC UA: 6 (ref 5–8)
POC BLOOD, URINE: NEGATIVE
POC NITRATES, URINE: NEGATIVE
PROT UR QL STRIP: NEGATIVE
SP GR UR STRIP: 1.01 (ref 1–1.03)
UROBILINOGEN UR STRIP-ACNC: NORMAL (ref 0.1–1.1)

## 2020-01-13 PROCEDURE — 81003 POCT URINALYSIS, DIPSTICK, AUTOMATED, W/O SCOPE: ICD-10-PCS | Mod: QW,S$GLB,, | Performed by: PHYSICIAN ASSISTANT

## 2020-01-13 PROCEDURE — 81025 POCT URINE PREGNANCY: ICD-10-PCS | Mod: S$GLB,,, | Performed by: PHYSICIAN ASSISTANT

## 2020-01-13 PROCEDURE — 81003 URINALYSIS AUTO W/O SCOPE: CPT | Mod: QW,S$GLB,, | Performed by: PHYSICIAN ASSISTANT

## 2020-01-13 PROCEDURE — 99214 OFFICE O/P EST MOD 30 MIN: CPT | Mod: 25,S$GLB,, | Performed by: PHYSICIAN ASSISTANT

## 2020-01-13 PROCEDURE — 99214 PR OFFICE/OUTPT VISIT, EST, LEVL IV, 30-39 MIN: ICD-10-PCS | Mod: 25,S$GLB,, | Performed by: PHYSICIAN ASSISTANT

## 2020-01-13 PROCEDURE — 81025 URINE PREGNANCY TEST: CPT | Mod: S$GLB,,, | Performed by: PHYSICIAN ASSISTANT

## 2020-01-13 RX ORDER — ONDANSETRON 4 MG/1
4 TABLET, ORALLY DISINTEGRATING ORAL EVERY 8 HOURS PRN
Qty: 30 TABLET | Refills: 0 | Status: SHIPPED | OUTPATIENT
Start: 2020-01-13 | End: 2020-02-20

## 2020-01-14 NOTE — PATIENT INSTRUCTIONS
If you were prescribed a narcotic or controlled medication, do not drive or operate heavy equipment or machinery while taking these medications.  You must understand that you've received an Urgent Care treatment only and that you may be released before all your medical problems are known or treated. You, the patient, will arrange for follow up care as instructed.  Follow up with your PCP or specialty clinic as directed if not improved or as needed. You can call [phone] to schedule an appointment with the appropriate provider.  If your condition worsens we recommend that you receive another evaluation at the Emergency Department for any concerns or worsening of condition.  Patient aware and verbalized understanding.    Discussed NEGATIVE UA and UPT results with patient.  Rest and hydrate with plenty of fluids.  Recommended very bland diet for the next 24-48 hours.   Avoid spicy foods and fast food/greasy food until symptoms have resolved.  Zofran RX as needed for nausea/vomiting.  OTC Tylenol every 6 hours as needed for headache, fever or pain.  Advised patient to follow-up with PCP and OBGYN for further evaluation as needed.  ER precautions given to patient.  Patient aware and verbalized understanding.    Unknown Causes of Abdominal Pain (Female)    The exact cause of your abdominal (stomach) pain is not clear. This does not mean that this is something to worry about. Everyone likes to know the exact cause of the problem, but sometimes with abdominal pain, there is no clear-cut cause, and this could be a good thing. The good news is that your symptoms can be treated, and you will feel better.   Your condition does not seem serious now; however, sometimes the signs of a serious problem may take more time to appear. For this reason, it is important for you to watch for any new symptoms, problems, or worsening of your condition.  Over the next few days, the abdominal pain may come and go, or be continuous. Other  common symptoms can include nausea and vomiting. Sometimes it can be difficult to tell if you feel nauseous, you may just feel bad and not associate that feeling with nausea. Constipation, diarrhea, and a fever may go along with the pain.  The pain may continue even if treated correctly over the following days. Depending on how things go, sometimes the cause can become clear and may require further or different treatment. Additional evaluations, medications, or tests may also be needed.  Home care  Your healthcare provider may prescribe medicine for pain, symptoms, or an infection.  Follow the healthcare provider's instructions for taking these medicines.  General care  · Rest as much as you can until your next exam. No strenuous activities.  · Try to find positions that ease discomfort. A small pillow placed on the abdomen may help relieve pain.  · Something warm on your abdomen (such as a heating pad) may help, but be careful not to burn yourself.  Diet  · Do not force yourself to eat, especially if having cramps, vomiting, or diarrhea.  · Water is important so you do not get dehydrated. Soup may also be good. Sports drinks may also help, especially if they are not too acidic. Make sure you don't drink sugary drinks as this can make things worse. Take liquids in small amounts. Do not guzzle them.  · Caffeine sometimes makes the pain and cramping worse.  · Avoid dairy products if you have vomiting or diarrhea.  · Don't eat large amounts at a time. Wait a few minutes between bites.  · Eat a diet low in fiber (called a low-residue diet). Foods allowed include refined breads, white rice, fruit and vegetable juices without pulp, tender meats. These foods will pass more easily through the intestine.  · Avoid whole-grain foods, whole fruits and vegetables, meats, seeds and nuts, fried or fatty foods, dairy, alcohol and spicy foods until your symptoms go away.  Follow-up care  Follow up with your healthcare provider, or  as advised, if your pain does not begin to improve in the next 24 hours.  Call 911  Call 911 if any of these occur:  · Trouble breathing  · Confusion  · Fainting or loss of consciousness  · Rapid heart rate  · Seizure  When to seek medical advice  Call your healthcare provider right away if any of these occur:  · Pain gets worse or moves to the right lower abdomen  · New or worsening vomiting or diarrhea  · Swelling of the abdomen  · Unable to pass stool for more than 3 days  · Fever of 100.4ºF (38ºC) or higher, or as directed by your healthcare provider.  · Blood in vomit or bowel movements (dark red or black color)  · Jaundice (yellow color of eyes and skin)  · Weakness, dizziness  · Chest, arm, back, neck or jaw pain  · Unexpected vaginal bleeding or missed period  · Can't keep down liquids or water and are getting dehydrated  Date Last Reviewed: 12/30/2015  © 8215-2264 [employer] [address] [place] [address], [place] [address]. All rights reserved. This information is not intended as a substitute for professional medical care. Always follow your healthcare professional's instructions.

## 2020-01-14 NOTE — PROGRESS NOTES
"Subjective:       Patient ID: Kaci Moore is a 29 y.o. female.    Vitals:  height is 5' 2" (1.575 m) and weight is 63.5 kg (140 lb). Her oral temperature is 99.6 °F (37.6 °C). Her blood pressure is 131/78 and her pulse is 82. Her oxygen saturation is 99%.     Chief Complaint: Abdominal Pain    Patient with PMHx of ovarian cysts presents to urgent care with intermittent RLQ pain x 5 days. Patient reports intermittent nausea with the RLQ pain. Patient denies fever, chills, body aches, active CP, SOB, wheezing, vomiting, diarrhea, constipation, hematuria, vaginal discharge/bleeding, headache, blurry vision, dizziness or syncope. Patient reports that she recently started taking Cymbalta RX. Patient reports that she has follow-up appointment with OBGYN for later this week. Patient denies abdominal surgeries. Patient has tried OTC Tylenol and Heating pad with only temporary relief.     Abdominal Pain   This is a new problem. The current episode started in the past 7 days. The problem occurs intermittently. The problem has been gradually worsening. The pain is located in the RLQ. The pain is at a severity of 3/10. The pain is mild. The quality of the pain is sharp. The abdominal pain radiates to the right flank and left flank. Associated symptoms include nausea. Pertinent negatives include no anorexia, arthralgias, belching, constipation, diarrhea, dysuria, fever, flatus, frequency, headaches, hematochezia, hematuria, melena, myalgias, vomiting or weight loss. Nothing aggravates the pain. The pain is relieved by nothing. She has tried acetaminophen (heating pad) for the symptoms. The treatment provided mild relief. There is no history of abdominal surgery, colon cancer, Crohn's disease, gallstones, GERD, irritable bowel syndrome, pancreatitis, PUD or ulcerative colitis. Patient's medical history does not include kidney stones and UTI.       Constitution: Negative for chills, sweating, fatigue and fever. "   HENT: Negative for ear pain, drooling, congestion, sore throat, trouble swallowing and voice change.    Neck: Negative for neck pain, neck stiffness, painful lymph nodes and neck swelling.   Cardiovascular: Negative for chest pain, leg swelling, palpitations, sob on exertion and passing out.   Eyes: Negative for eye pain, eye redness, photophobia, double vision, blurred vision and eyelid swelling.   Respiratory: Negative for chest tightness, cough, sputum production, bloody sputum, shortness of breath, stridor and wheezing.    Gastrointestinal: Positive for abdominal pain and nausea. Negative for abdominal trauma, abdominal bloating, history of abdominal surgery, vomiting, constipation, diarrhea, bright red blood in stool, dark colored stools, rectal bleeding, rectal pain, hemorrhoids, heartburn and bowel incontinence.   Genitourinary: Positive for flank pain and ovarian cysts. Negative for dysuria, frequency, urgency, urine decreased, bladder incontinence, bed wetting, hematuria, history of kidney stones, painful menstruation, irregular menstruation, missed menses, heavy menstrual bleeding, genital trauma, vaginal pain, vaginal discharge, vaginal bleeding, vaginal odor, painful intercourse, genital sore, painful ejaculation and pelvic pain.   Musculoskeletal: Negative for joint pain, joint swelling, abnormal ROM of joint, back pain, muscle cramps and muscle ache.   Skin: Negative for rash and hives.   Allergic/Immunologic: Negative for seasonal allergies, food allergies, hives, itching and sneezing.   Neurological: Negative for dizziness, light-headedness, passing out, loss of balance, headaches, altered mental status, loss of consciousness and seizures.   Hematologic/Lymphatic: Negative for swollen lymph nodes.   Psychiatric/Behavioral: Negative for altered mental status and nervous/anxious. The patient is not nervous/anxious.        Objective:      Physical Exam   Constitutional: She is oriented to person,  place, and time. She appears well-developed and well-nourished.  Non-toxic appearance. She does not appear ill. No distress.   Patient is stable, seated comfortably in NAD.   HENT:   Head: Normocephalic and atraumatic.   Right Ear: External ear normal.   Left Ear: External ear normal.   Nose: Nose normal.   Mouth/Throat: Uvula is midline, oropharynx is clear and moist and mucous membranes are normal. No posterior oropharyngeal erythema.   Eyes: Conjunctivae and lids are normal.   Neck: Trachea normal, normal range of motion and full passive range of motion without pain. Neck supple.   Cardiovascular: Normal rate, regular rhythm and normal heart sounds.   Pulmonary/Chest: Effort normal and breath sounds normal. No accessory muscle usage or stridor. No respiratory distress. She has no decreased breath sounds. She has no wheezes. She has no rhonchi. She has no rales.   Abdominal: Soft. Normal appearance and bowel sounds are normal. She exhibits no distension, no abdominal bruit, no pulsatile midline mass and no mass. There is no tenderness. There is no rigidity, no rebound, no guarding, no CVA tenderness, no tenderness at McBurney's point and negative Cooley's sign. No hernia.   Musculoskeletal: Normal range of motion. She exhibits no edema.   Lymphadenopathy:     She has no cervical adenopathy.   Neurological: She is alert and oriented to person, place, and time. She has normal strength.   Skin: Skin is warm, dry, intact, not diaphoretic, not pale and no rash. Capillary refill takes less than 2 seconds.   Psychiatric: She has a normal mood and affect. Her speech is normal and behavior is normal. Judgment and thought content normal. Cognition and memory are normal.   Nursing note and vitals reviewed.        Results for orders placed or performed in visit on 01/13/20   POCT Urinalysis, Dipstick, Automated, W/O Scope   Result Value Ref Range    POC Blood, Urine Negative Negative    POC Bilirubin, Urine Negative Negative     POC Urobilinogen, Urine Normal 0.1 - 1.1    POC Ketones, Urine Negative Negative    POC Protein, Urine Negative Negative    POC Nitrates, Urine Negative Negative    POC Glucose, Urine Negative Negative    pH, UA 6.0 5 - 8    POC Specific Gravity, Urine 1.010 1.003 - 1.029    POC Leukocytes, Urine Negative Negative    Color, UA Light Yellow Light Yellow, Yellow   POCT urine pregnancy   Result Value Ref Range    POC Preg Test, Ur Negative Negative     Acceptable Yes        Assessment:       1. Abdominal pain, unspecified abdominal location    2. Nausea        Plan:     Discussed NEGATIVE UA and UPT with patient. Advised close follow-up with PCP as needed and gave patient strict ER precautions as well. Patient is stable, seated comfortably in NAD upon discharge. Patient aware, verbalized understanding and agreed with plan of care.      Abdominal pain, unspecified abdominal location  -     POCT Urinalysis, Dipstick, Automated, W/O Scope  -     POCT urine pregnancy    Nausea  -     ondansetron (ZOFRAN-ODT) 4 MG TbDL; Take 1 tablet (4 mg total) by mouth every 8 (eight) hours as needed.  Dispense: 30 tablet; Refill: 0      Patient Instructions   If you were prescribed a narcotic or controlled medication, do not drive or operate heavy equipment or machinery while taking these medications.  You must understand that you've received an Urgent Care treatment only and that you may be released before all your medical problems are known or treated. You, the patient, will arrange for follow up care as instructed.  Follow up with your PCP or specialty clinic as directed if not improved or as needed. You can call [phone] to schedule an appointment with the appropriate provider.  If your condition worsens we recommend that you receive another evaluation at the Emergency Department for any concerns or worsening of condition.  Patient aware and verbalized understanding.    Discussed NEGATIVE UA and UPT results  with patient.  Rest and hydrate with plenty of fluids.  Recommended very bland diet for the next 24-48 hours.   Avoid spicy foods and fast food/greasy food until symptoms have resolved.  Zofran RX as needed for nausea/vomiting.  OTC Tylenol every 6 hours as needed for headache, fever or pain.  Advised patient to follow-up with PCP and OBGYN for further evaluation as needed.  ER precautions given to patient.  Patient aware and verbalized understanding.    Unknown Causes of Abdominal Pain (Female)    The exact cause of your abdominal (stomach) pain is not clear. This does not mean that this is something to worry about. Everyone likes to know the exact cause of the problem, but sometimes with abdominal pain, there is no clear-cut cause, and this could be a good thing. The good news is that your symptoms can be treated, and you will feel better.   Your condition does not seem serious now; however, sometimes the signs of a serious problem may take more time to appear. For this reason, it is important for you to watch for any new symptoms, problems, or worsening of your condition.  Over the next few days, the abdominal pain may come and go, or be continuous. Other common symptoms can include nausea and vomiting. Sometimes it can be difficult to tell if you feel nauseous, you may just feel bad and not associate that feeling with nausea. Constipation, diarrhea, and a fever may go along with the pain.  The pain may continue even if treated correctly over the following days. Depending on how things go, sometimes the cause can become clear and may require further or different treatment. Additional evaluations, medications, or tests may also be needed.  Home care  Your healthcare provider may prescribe medicine for pain, symptoms, or an infection.  Follow the healthcare provider's instructions for taking these medicines.  General care  · Rest as much as you can until your next exam. No strenuous activities.  · Try to find  positions that ease discomfort. A small pillow placed on the abdomen may help relieve pain.  · Something warm on your abdomen (such as a heating pad) may help, but be careful not to burn yourself.  Diet  · Do not force yourself to eat, especially if having cramps, vomiting, or diarrhea.  · Water is important so you do not get dehydrated. Soup may also be good. Sports drinks may also help, especially if they are not too acidic. Make sure you don't drink sugary drinks as this can make things worse. Take liquids in small amounts. Do not guzzle them.  · Caffeine sometimes makes the pain and cramping worse.  · Avoid dairy products if you have vomiting or diarrhea.  · Don't eat large amounts at a time. Wait a few minutes between bites.  · Eat a diet low in fiber (called a low-residue diet). Foods allowed include refined breads, white rice, fruit and vegetable juices without pulp, tender meats. These foods will pass more easily through the intestine.  · Avoid whole-grain foods, whole fruits and vegetables, meats, seeds and nuts, fried or fatty foods, dairy, alcohol and spicy foods until your symptoms go away.  Follow-up care  Follow up with your healthcare provider, or as advised, if your pain does not begin to improve in the next 24 hours.  Call 911  Call 911 if any of these occur:  · Trouble breathing  · Confusion  · Fainting or loss of consciousness  · Rapid heart rate  · Seizure  When to seek medical advice  Call your healthcare provider right away if any of these occur:  · Pain gets worse or moves to the right lower abdomen  · New or worsening vomiting or diarrhea  · Swelling of the abdomen  · Unable to pass stool for more than 3 days  · Fever of 100.4ºF (38ºC) or higher, or as directed by your healthcare provider.  · Blood in vomit or bowel movements (dark red or black color)  · Jaundice (yellow color of eyes and skin)  · Weakness, dizziness  · Chest, arm, back, neck or jaw pain  · Unexpected vaginal bleeding or  missed period  · Can't keep down liquids or water and are getting dehydrated  Date Last Reviewed: 12/30/2015  © 3207-2600 [employer] [address] [place] [address], [place] [address]. All rights reserved. This information is not intended as a substitute for professional medical care. Always follow your healthcare professional's instructions.

## 2020-01-20 PROBLEM — F41.9 ANXIETY: Status: ACTIVE | Noted: 2020-01-20

## 2020-02-07 ENCOUNTER — OFFICE VISIT (OUTPATIENT)
Dept: OBSTETRICS AND GYNECOLOGY | Facility: CLINIC | Age: 30
End: 2020-02-07
Payer: COMMERCIAL

## 2020-02-07 VITALS
DIASTOLIC BLOOD PRESSURE: 70 MMHG | SYSTOLIC BLOOD PRESSURE: 110 MMHG | RESPIRATION RATE: 20 BRPM | BODY MASS INDEX: 25.77 KG/M2 | WEIGHT: 140.88 LBS

## 2020-02-07 DIAGNOSIS — Z12.4 PAP SMEAR FOR CERVICAL CANCER SCREENING: Primary | ICD-10-CM

## 2020-02-07 DIAGNOSIS — Z97.5 IUD CONTRACEPTION: ICD-10-CM

## 2020-02-07 PROCEDURE — 99395 PREV VISIT EST AGE 18-39: CPT | Mod: S$GLB,,, | Performed by: OBSTETRICS & GYNECOLOGY

## 2020-02-07 PROCEDURE — 99999 PR PBB SHADOW E&M-EST. PATIENT-LVL III: CPT | Mod: PBBFAC,,, | Performed by: OBSTETRICS & GYNECOLOGY

## 2020-02-07 PROCEDURE — 88175 CYTOPATH C/V AUTO FLUID REDO: CPT

## 2020-02-07 PROCEDURE — 99395 PR PREVENTIVE VISIT,EST,18-39: ICD-10-PCS | Mod: S$GLB,,, | Performed by: OBSTETRICS & GYNECOLOGY

## 2020-02-07 PROCEDURE — 87624 HPV HI-RISK TYP POOLED RSLT: CPT

## 2020-02-07 PROCEDURE — 99999 PR PBB SHADOW E&M-EST. PATIENT-LVL III: ICD-10-PCS | Mod: PBBFAC,,, | Performed by: OBSTETRICS & GYNECOLOGY

## 2020-02-07 NOTE — PROGRESS NOTES
Chief Complaint   Patient presents with    Well Woman       History and Physical:  Patient's last menstrual period was 01/23/2020.       Kaci Moore is a 29 y.o.  female who presents today for her routine annual GYN exam. The patient has heavy painful cycles for > 1 year, deep dyspareunia- counseled. No bowel or bladder complaints. Normal pelvic ultrasound and abd/pelvic CT with small liver hemangioma - reviewed. Recommended against oral contraceptive pills - probable best treatment option is progesterone IUS- Ling or Kyleena.       Allergies: Review of patient's allergies indicates:  No Known Allergies    Past Medical History:   Diagnosis Date    Abdominal pain, RLQ (right lower quadrant) 3/4/2013    Abnormal Pap smear of vagina     Allergy     Anemia     Hyperglycemia     Pituitary adenoma     Thyroid disease     thyroid level fluctuate    Well female exam with routine gynecological exam 11/21/2012       Past Surgical History:   Procedure Laterality Date    COLONOSCOPY  06/30/2017    F [place]    CYST REMOVAL  2013    left wrist-benign    ENDOSCOPIC NASAL SEPTOPLASTY Bilateral 12/28/2018    Procedure: SEPTOPLASTY, NOSE, ENDOSCOPIC;  Surgeon: Sam Robin MD;  Location: [facility] OR;  Service: ENT;  Laterality: Bilateral;    FUNCTIONAL ENDOSCOPIC SINUS SURGERY (FESS) USING COMPUTER-ASSISTED NAVIGATION Bilateral 12/28/2018    Procedure: FESS, USING COMPUTER-ASSISTED NAVIGATION;  Surgeon: Sam Robin MD;  Location: [facility] OR;  Service: ENT;  Laterality: Bilateral;    NASAL TURBINATE REDUCTION Bilateral 12/28/2018    Procedure: REDUCTION, NASAL TURBINATE;  Surgeon: Sam Robin MD;  Location: [facility] OR;  Service: ENT;  Laterality: Bilateral;    TONSILLECTOMY  1998    WISDOM TOOTH EXTRACTION  2009       MEDS:   Current Outpatient Medications on File Prior to Visit   Medication Sig Dispense Refill    azelastine (ASTELIN) 137 mcg (0.1 %) nasal spray 2 sprays 2 (two)  times daily.  5    butalbital-acetaminophen-caffeine -40 mg (FIORICET, ESGIC) -40 mg per tablet       DULoxetine (CYMBALTA) 20 MG capsule Take 1 capsule (20 mg total) by mouth once daily. 30 capsule 11    ferrous sulfate, dried (SLOW FE) 160 mg (50 mg iron) TbSR Take 160 mg by mouth once daily.      fluticasone propionate (XHANCE) 93 mcg/actuation AerB       montelukast (SINGULAIR) 10 mg tablet Take 10 mg by mouth nightly.  5    ondansetron (ZOFRAN) 8 MG tablet Take 1 tablet (8 mg total) by mouth every 6 (six) hours as needed for Nausea. 20 tablet 1    prenatal vit calc,iron,folic (PRENAT.VITS,CELESTE,MIN-IRON-FOLIC ORAL) Take by mouth.      rizatriptan (MAXALT-MLT) 5 MG disintegrating tablet Take 1 tablet (5 mg total) by mouth as needed for Migraine. May repeat in 2 hours if needed 15 tablet 1    traZODone (DESYREL) 50 MG tablet       ondansetron (ZOFRAN-ODT) 4 MG TbDL Take 1 tablet (4 mg total) by mouth every 8 (eight) hours as needed. 30 tablet 0     No current facility-administered medications on file prior to visit.        OB History        0    Para        Term                AB        Living           SAB        TAB        Ectopic        Multiple        Live Births                     Social History     Socioeconomic History    Marital status: Significant Other     Spouse name: Not on file    Number of children: 0    Years of education: Not on file    Highest education level: Not on file   Occupational History    Occupation: medical assistant     Employer: OTHER     Comment: [employer]   Social Needs    Financial resource strain: Not very hard    Food insecurity:     Worry: Never true     Inability: Never true    Transportation needs:     Medical: No     Non-medical: No   Tobacco Use    Smoking status: Former Smoker     Packs/day: 0.25     Years: 3.00     Pack years: 0.75     Last attempt to quit: 10/21/2018     Years since quittin.2    Smokeless tobacco: Never Used    Substance and Sexual Activity    Alcohol use: No     Frequency: 2-4 times a month     Drinks per session: 3 or 4     Binge frequency: Never    Drug use: Never    Sexual activity: Yes     Partners: Male     Birth control/protection: Condom, OCP   Lifestyle    Physical activity:     Days per week: 5 days     Minutes per session: 50 min    Stress: To some extent   Relationships    Social connections:     Talks on phone: More than three times a week     Gets together: More than three times a week     Attends Taoist service: Not on file     Active member of club or organization: Yes     Attends meetings of clubs or organizations: Not on file     Relationship status: Never    Other Topics Concern    Not on file   Social History Narrative    Not on file       Family History   Problem Relation Age of Onset    Breast cancer Maternal Grandmother     Cancer Maternal Grandmother     ADD / ADHD Mother     Bipolar disorder Mother     Allergies Mother     Diabetes Maternal Grandfather     Crohn's disease Father     Diabetes Paternal Grandmother     COPD Paternal Grandfather     No Known Problems Sister     Ovarian cancer Neg Hx          Past medical and surgical history reviewed.   I have reviewed the patient's medical history in detail and updated the computerized patient record.        Review of System:   General: no chills, fever, night sweats, weight gain or weight loss  Psychological: no depression or suicidal ideation  Breasts: no new or changing breast lumps, nipple discharge or masses.  Respiratory: no cough, shortness of breath, or wheezing  Cardiovascular: no chest pain or dyspnea on exertion  Gastrointestinal: no abdominal pain, change in bowel habits, or black or bloody stools  Genito-Urinary: no incontinence, urinary frequency/urgency or vulvar/vaginal symptoms.   Musculoskeletal: no gait disturbance or muscular weakness      Physical Exam:   /70   Resp 20   Wt 63.9 kg (140 lb 14  oz)   LMP 01/23/2020   BMI 25.77 kg/m²   Constitutional: She appears alert and responsive. She appears well-developed, well-groomed, and well-nourished. No distress. Normal   HENT:   Head: Normocephalic and atraumatic.   Eyes: Conjunctivae and EOM are normal. No scleral icterus.   Neck: Symmetrical. Normal range of motion. Neck supple. No tracheal deviation present. THYROID:  without masses or tenderness.  Cardiovascular: Normal rate, no rhythm abnormality noted. Extremities without swelling or edema, warm.    Pulmonary/Chest: Normal respiratory Effort. No distress or retractions. She exhibits no tenderness.  Breasts: are symmetrical.   Right breast exhibits no inverted nipple, no mass, no nipple discharge, no skin change and no tenderness.   Left breast exhibits no inverted nipple, no mass, no nipple discharge, no skin change and no tenderness.  Abdominal: Soft. She exhibits no distension, hernias or masses. There is no tenderness. No enlargement of liver edge or spleen.  There is no rebound and no guarding.   Genitourinary:    External rectal exam shows no thrombosed external hemorrhoids, no lesions.     Pelvic exam was performed with patient supine.   No labial fusion, and symmetrical.    There is no rash, lesion or injury on the right labia.   There is no rash, lesion or injury on the left labia.   No bleeding and no signs of injury around the vaginal introitus, urethral meatus is normal size and without prolapse or lesions, urethra well supported. The cervix is visualized with no discharge, lesions or friability.   No vaginal discharge found.   No significant Cystocele, Enterocele or rectocele, and cervix and uterus well supported.   Bimanual exam:   The urethra is normal to palpation and there are no palpable vaginal wall masses.   Uterus is not deviated, not enlarged, not fixed, normal shape and not tender.   Cervix exhibits no motion tenderness.    Right adnexum displays no mass or nodularity and no  tenderness.   Left adnexum displays no mass or nodularity and no tenderness.  Musculoskeletal: Normal range of motion.   Lymphadenopathy: No inguinal adenopathy present.   Neurological: She is alert and oriented to person, place, and time. Coordination normal.   Skin: Skin is warm and dry. She is not diaphoretic. No rashes, lesions or ulcers.   Psychiatric: She has a normal mood and affect, oriented to person, place, and time.      Assessment:   Normal annual GYN exam  1. Pap smear for cervical cancer screening  Liquid-Based Pap Smear, Screening    HPV High Risk Genotypes, PCR   menorrhagia / dysmenorrhea / dyspareunia - liver hemangioma on CT    Plan:   PAP  Recommended Ling or Kyleena IUD for cycle control and pain.   Follow up in 1 year.  Patient informed will be contacted with results within 2 weeks. Encouraged to please call back or email if she has not heard from us by then.

## 2020-02-11 ENCOUNTER — PATIENT MESSAGE (OUTPATIENT)
Dept: OBSTETRICS AND GYNECOLOGY | Facility: CLINIC | Age: 30
End: 2020-02-11

## 2020-02-11 ENCOUNTER — TELEPHONE (OUTPATIENT)
Dept: OBSTETRICS AND GYNECOLOGY | Facility: CLINIC | Age: 30
End: 2020-02-11

## 2020-02-11 DIAGNOSIS — Z30.430 ENCOUNTER FOR IUD INSERTION: Primary | ICD-10-CM

## 2020-02-14 LAB
HPV HR 12 DNA SPEC QL NAA+PROBE: NEGATIVE
HPV16 AG SPEC QL: NEGATIVE
HPV18 DNA SPEC QL NAA+PROBE: NEGATIVE

## 2020-02-20 PROBLEM — D18.03 HEMANGIOMA OF LIVER: Status: ACTIVE | Noted: 2020-02-20

## 2020-02-26 ENCOUNTER — TELEPHONE (OUTPATIENT)
Dept: OBSTETRICS AND GYNECOLOGY | Facility: CLINIC | Age: 30
End: 2020-02-26

## 2020-02-28 ENCOUNTER — TELEPHONE (OUTPATIENT)
Dept: OBSTETRICS AND GYNECOLOGY | Facility: CLINIC | Age: 30
End: 2020-02-28

## 2020-02-28 NOTE — TELEPHONE ENCOUNTER
----- Message from Monika Mason sent at 2/28/2020 12:42 PM CST -----  Contact: self [phone]   Patient requesting status of iud, stated she been waiting for 3 weeks.  Patient stated she have had 3 periods within the last 3 weeks.  Patient may be reached via cell [phone], if no answer leave detail message on patient portal.

## 2020-03-04 ENCOUNTER — TELEPHONE (OUTPATIENT)
Dept: OBSTETRICS AND GYNECOLOGY | Facility: CLINIC | Age: 30
End: 2020-03-04

## 2020-03-04 NOTE — TELEPHONE ENCOUNTER
----- Message from Lizandro Collins MD sent at 3/4/2020  3:53 PM CST -----  Kyleena IUD approved, please order and schedule insertion

## 2020-03-06 ENCOUNTER — TELEPHONE (OUTPATIENT)
Dept: OBSTETRICS AND GYNECOLOGY | Facility: CLINIC | Age: 30
End: 2020-03-06

## 2020-03-06 LAB
FINAL PATHOLOGIC DIAGNOSIS: NORMAL
Lab: NORMAL

## 2020-03-06 NOTE — TELEPHONE ENCOUNTER
----- Message from Robert Garcia sent at 3/6/2020  8:49 AM CST -----  Type: Needs Medical Advice    Who Called:  Self   Symptoms (please be specific): NA   How long has patient had these symptoms:    Pharmacy name and phone #:    Best Call Back Number: [phone]   Additional Information: Patient called asking for an update for the IUD

## 2020-03-06 NOTE — TELEPHONE ENCOUNTER
Patient called back and appointment was placed for patient to have their IUD placed. Patient agreed with time and date. Patient stated thanks for the call back.

## 2020-03-18 ENCOUNTER — OFFICE VISIT (OUTPATIENT)
Dept: OBSTETRICS AND GYNECOLOGY | Facility: CLINIC | Age: 30
End: 2020-03-18
Payer: COMMERCIAL

## 2020-03-18 VITALS
SYSTOLIC BLOOD PRESSURE: 116 MMHG | WEIGHT: 136.25 LBS | BODY MASS INDEX: 24.92 KG/M2 | DIASTOLIC BLOOD PRESSURE: 66 MMHG

## 2020-03-18 DIAGNOSIS — Z30.9 ENCOUNTER FOR CONTRACEPTIVE MANAGEMENT, UNSPECIFIED TYPE: Primary | ICD-10-CM

## 2020-03-18 LAB
B-HCG UR QL: NEGATIVE
CTP QC/QA: YES

## 2020-03-18 PROCEDURE — 81025 POCT URINE PREGNANCY: ICD-10-PCS | Mod: S$GLB,,, | Performed by: OBSTETRICS & GYNECOLOGY

## 2020-03-18 PROCEDURE — 58300 PR INSERT INTRAUTERINE DEVICE: ICD-10-PCS | Mod: S$GLB,,, | Performed by: OBSTETRICS & GYNECOLOGY

## 2020-03-18 PROCEDURE — 81025 URINE PREGNANCY TEST: CPT | Mod: S$GLB,,, | Performed by: OBSTETRICS & GYNECOLOGY

## 2020-03-18 PROCEDURE — 99999 PR PBB SHADOW E&M-EST. PATIENT-LVL III: CPT | Mod: PBBFAC,,, | Performed by: OBSTETRICS & GYNECOLOGY

## 2020-03-18 PROCEDURE — 58300 INSERT INTRAUTERINE DEVICE: CPT | Mod: S$GLB,,, | Performed by: OBSTETRICS & GYNECOLOGY

## 2020-03-18 PROCEDURE — 99499 UNLISTED E&M SERVICE: CPT | Mod: S$GLB,,, | Performed by: OBSTETRICS & GYNECOLOGY

## 2020-03-18 PROCEDURE — 99999 PR PBB SHADOW E&M-EST. PATIENT-LVL III: ICD-10-PCS | Mod: PBBFAC,,, | Performed by: OBSTETRICS & GYNECOLOGY

## 2020-03-18 PROCEDURE — 99499 NO LOS: ICD-10-PCS | Mod: S$GLB,,, | Performed by: OBSTETRICS & GYNECOLOGY

## 2020-03-18 NOTE — PROGRESS NOTES
Intrauterine device Placement:  3/18/2020      PRE-IUD PLACEMENT COUNSELING:  All contraceptive options were reviewed and the patient chooses an IUD.  The patient's history was reviewed and there are no contraindications to an IUD. The procedure and minimal risks of pain, bleeding, perforation and infection at the insertion and spontaneous expulsion within the first two weeks was discussed. The benefits of amenorrhea and no systemic side effects were explained. All questions were answered and the patient agrees to proceed. Consent was signed (scanned into computer).    EXAM:  Uterine Position: antiverted    PROCEDURE:  TIME OUT PERFORMED.  The cervix visualized with a speculum.  A single tooth tenaculum was placed on the anterior lip.  The uterus sounded to 7cm using sterile technique.  A Ling IUD (lot#[id number])  was loaded and placed high in uterine fundus without difficulty using sterile technique.  The string was cut to 2cm length from exo cervix.   All instruments were removed from the cervix and vagina and the procedure was tolerated well.     ASSESSMENT:  1. Contraception management / IUD insertion.V25.0.    POST IUD PLACEMENT COUNSELING:  Manage post IUD placement pain with NSAIDs, Tylenol or Rx per Med[employer].  IUD danger signs and how to check the strings.  Removal in 5 years for Mirena IUD and in 10 years for Copper IUD.    Counseling lasted approximately 15 minutes and all her questions were answered.    FOLLOW-UP: With me in four weeks.

## 2020-03-19 ENCOUNTER — PATIENT MESSAGE (OUTPATIENT)
Dept: OBSTETRICS AND GYNECOLOGY | Facility: CLINIC | Age: 30
End: 2020-03-19

## 2020-04-07 PROBLEM — Z97.5 IUD (INTRAUTERINE DEVICE) IN PLACE: Status: ACTIVE | Noted: 2020-04-07

## 2020-04-07 PROBLEM — J98.01 COUGH DUE TO BRONCHOSPASM: Status: ACTIVE | Noted: 2020-04-07

## 2020-04-07 PROBLEM — N92.6 MENSTRUATION, ABNORMAL: Status: ACTIVE | Noted: 2020-04-07

## 2020-04-20 ENCOUNTER — PATIENT MESSAGE (OUTPATIENT)
Dept: OBSTETRICS AND GYNECOLOGY | Facility: CLINIC | Age: 30
End: 2020-04-20

## 2020-04-21 ENCOUNTER — OFFICE VISIT (OUTPATIENT)
Dept: OBSTETRICS AND GYNECOLOGY | Facility: CLINIC | Age: 30
End: 2020-04-21
Payer: COMMERCIAL

## 2020-04-21 VITALS
DIASTOLIC BLOOD PRESSURE: 62 MMHG | WEIGHT: 131.19 LBS | BODY MASS INDEX: 24.14 KG/M2 | SYSTOLIC BLOOD PRESSURE: 96 MMHG | HEIGHT: 62 IN

## 2020-04-21 DIAGNOSIS — Z30.431 IUD CHECK UP: Primary | ICD-10-CM

## 2020-04-21 PROCEDURE — 3008F PR BODY MASS INDEX (BMI) DOCUMENTED: ICD-10-PCS | Mod: CPTII,S$GLB,, | Performed by: OBSTETRICS & GYNECOLOGY

## 2020-04-21 PROCEDURE — 99213 PR OFFICE/OUTPT VISIT, EST, LEVL III, 20-29 MIN: ICD-10-PCS | Mod: S$GLB,,, | Performed by: OBSTETRICS & GYNECOLOGY

## 2020-04-21 PROCEDURE — 99213 OFFICE O/P EST LOW 20 MIN: CPT | Mod: S$GLB,,, | Performed by: OBSTETRICS & GYNECOLOGY

## 2020-04-21 PROCEDURE — 3008F BODY MASS INDEX DOCD: CPT | Mod: CPTII,S$GLB,, | Performed by: OBSTETRICS & GYNECOLOGY

## 2020-04-21 PROCEDURE — 99999 PR PBB SHADOW E&M-EST. PATIENT-LVL III: ICD-10-PCS | Mod: PBBFAC,,, | Performed by: OBSTETRICS & GYNECOLOGY

## 2020-04-21 PROCEDURE — 99999 PR PBB SHADOW E&M-EST. PATIENT-LVL III: CPT | Mod: PBBFAC,,, | Performed by: OBSTETRICS & GYNECOLOGY

## 2020-04-21 NOTE — PROGRESS NOTES
"History of Present Illness:   Pateint presents today 1 month status post Intrauterine Device  Insertion without complaint. .    Pathology: none    Physical exam:  BP 96/62   Ht 5' 2" (1.575 m)   Wt 59.5 kg (131 lb 2.8 oz)   BMI 23.99 kg/m²   Constitutional: She is oriented to person, place, and time. She appears well-developed and well-nourished. No distress.   HENT:   Head: Normocephalic and atraumatic.   Eyes: Conjunctivae and EOM are normal. No scleral icterus.   Neck: Normal range of motion. Neck supple. No tracheal deviation present.   Cardiovascular: Normal rate.    Pulmonary/Chest: Effort normal. No respiratory distress. She exhibits no tenderness.  Breasts: deferred  Abdominal: Soft. She exhibits no distension and no mass. There is no rebound and no guarding.   Genitourinary:     External rectal exam shows no thrombosed external hemorrhoids.    Pelvic exam was performed with patient supine.   There is no rash, lesion or injury on the right labia.   There is no rash, lesion or injury on the left labia.   No bleeding and no signs of injury around the vaginal introitus, urethra is without lesions and well supported. The cervix is visualized with no discharge, lesions or friability. Intrauterine Device string easily visualized.    No vaginal discharge found.    No significant Cystocele, Enterocele or rectocele, and uterus well supported.   Bimanual exam:   The urethra is normal to palpation and there are no palpable vaginal wall masses.   Uterus is not deviated, not enlarged, not fixed, normal shape and not tender.   Cervix exhibits no motion tenderness.    Right adnexum displays no mass and no tenderness.   Left adnexum displays no mass and no tenderness.  Neurological: She is alert and oriented to person, place, and time. Coordination normal.   Skin: Skin is warm and dry. She is not diaphoretic.   Psychiatric: She has a normal mood and affect.    Assessment:  Doing well with IUD.    Plan:  Resume normal " activity and follow up as scheduled for routine screening.

## 2020-05-19 ENCOUNTER — TELEPHONE (OUTPATIENT)
Dept: OBSTETRICS AND GYNECOLOGY | Facility: CLINIC | Age: 30
End: 2020-05-19

## 2020-12-09 ENCOUNTER — OFFICE VISIT (OUTPATIENT)
Dept: URGENT CARE | Facility: CLINIC | Age: 30
End: 2020-12-09
Payer: COMMERCIAL

## 2020-12-09 VITALS
TEMPERATURE: 98 F | OXYGEN SATURATION: 99 % | BODY MASS INDEX: 22.82 KG/M2 | HEART RATE: 90 BPM | WEIGHT: 124 LBS | HEIGHT: 62 IN | SYSTOLIC BLOOD PRESSURE: 129 MMHG | RESPIRATION RATE: 16 BRPM | DIASTOLIC BLOOD PRESSURE: 80 MMHG

## 2020-12-09 DIAGNOSIS — Z03.818 ENCNTR FOR OBS FOR SUSP EXPSR TO OTH BIOLG AGENTS RULED OUT: ICD-10-CM

## 2020-12-09 DIAGNOSIS — U07.1 COVID-19 VIRUS DETECTED: ICD-10-CM

## 2020-12-09 DIAGNOSIS — U07.1 COVID-19: ICD-10-CM

## 2020-12-09 DIAGNOSIS — R52 GENERALIZED BODY ACHES: ICD-10-CM

## 2020-12-09 DIAGNOSIS — J02.9 SORE THROAT: Primary | ICD-10-CM

## 2020-12-09 LAB
CTP QC/QA: YES
SARS-COV-2 RDRP RESP QL NAA+PROBE: POSITIVE

## 2020-12-09 PROCEDURE — U0002 COVID-19 LAB TEST NON-CDC: HCPCS | Mod: QW,S$GLB,, | Performed by: INTERNAL MEDICINE

## 2020-12-09 PROCEDURE — 3008F PR BODY MASS INDEX (BMI) DOCUMENTED: ICD-10-PCS | Mod: CPTII,S$GLB,, | Performed by: INTERNAL MEDICINE

## 2020-12-09 PROCEDURE — U0002: ICD-10-PCS | Mod: QW,S$GLB,, | Performed by: INTERNAL MEDICINE

## 2020-12-09 PROCEDURE — 3008F BODY MASS INDEX DOCD: CPT | Mod: CPTII,S$GLB,, | Performed by: INTERNAL MEDICINE

## 2020-12-09 PROCEDURE — 99214 OFFICE O/P EST MOD 30 MIN: CPT | Mod: S$GLB,,, | Performed by: INTERNAL MEDICINE

## 2020-12-09 PROCEDURE — 99214 PR OFFICE/OUTPT VISIT, EST, LEVL IV, 30-39 MIN: ICD-10-PCS | Mod: S$GLB,,, | Performed by: INTERNAL MEDICINE

## 2020-12-09 RX ORDER — MINERAL OIL
180 ENEMA (ML) RECTAL DAILY
COMMUNITY
End: 2021-09-15

## 2020-12-09 NOTE — PROGRESS NOTES
"Subjective:       Patient ID: Kaci Moore is a 29 y.o. female.    Vitals:  height is 5' 2" (1.575 m) and weight is 56.2 kg (124 lb). Her temperature is 98.3 °F (36.8 °C). Her blood pressure is 129/80 and her pulse is 90. Her oxygen saturation is 99%.     Chief Complaint: COVID-19 Concerns    Patient c/o fatigue, sore throat, body aches, dizziness, cough x 3 days.    Other  This is a new problem. The current episode started in the past 7 days. The problem occurs constantly. The problem has been gradually worsening. Associated symptoms include congestion, coughing, fatigue, headaches, neck pain and a sore throat. Pertinent negatives include no abdominal pain, anorexia, arthralgias, change in bowel habit, chest pain, chills, diaphoresis, fever, joint swelling, myalgias, nausea, numbness, rash, swollen glands, urinary symptoms, vertigo, visual change, vomiting or weakness. Nothing aggravates the symptoms. Treatments tried: Mucinex. The treatment provided mild relief.       Constitution: Positive for fatigue. Negative for chills, sweating and fever.   HENT: Positive for congestion, sore throat and trouble swallowing (painful to swallow).    Neck: Positive for neck pain. Negative for painful lymph nodes.   Cardiovascular: Negative for chest pain and leg swelling.   Eyes: Negative for double vision and blurred vision.   Respiratory: Positive for chest tightness and cough. Negative for sputum production and shortness of breath.    Gastrointestinal: Negative for abdominal pain, nausea, vomiting and diarrhea.   Genitourinary: Negative for dysuria, frequency, urgency and history of kidney stones.   Musculoskeletal: Negative for joint pain, joint swelling, muscle cramps and muscle ache.   Skin: Negative for color change, pale, rash and bruising.   Allergic/Immunologic: Negative for seasonal allergies.   Neurological: Positive for headaches. Negative for dizziness, history of vertigo, light-headedness, passing " out and numbness.   Hematologic/Lymphatic: Negative for swollen lymph nodes.   Psychiatric/Behavioral: Negative for nervous/anxious, sleep disturbance and depression. The patient is not nervous/anxious.        Objective:      Physical Exam   Constitutional: She appears well-developed.   HENT:   Head: Normocephalic.   Eyes: Pupils are equal, round, and reactive to light. Conjunctivae are normal.   Neck: Normal range of motion. No tracheal deviation present. No thyromegaly present.   Cardiovascular: Normal rate, regular rhythm and normal heart sounds.   Pulmonary/Chest: Effort normal and breath sounds normal. No respiratory distress. She has no wheezes.   Musculoskeletal: Normal range of motion.   Neurological: She is alert. No cranial nerve deficit.   Skin: Skin is warm and not diaphoretic. Capillary refill takes less than 2 seconds.         Assessment:       1. Sore throat    2. Encntr for obs for susp expsr to oth biolg agents ruled out    3. Generalized body aches    4. COVID-19        Plan:         Sore throat  -     POCT COVID-19 Rapid Screening    Encntr for obs for susp expsr to oth biolg agents ruled out  -     POCT COVID-19 Rapid Screening    Generalized body aches  -     POCT COVID-19 Rapid Screening    COVID-19      Patient Instructions   POSITIVE COVID TEST    You have tested positive for COVID-19 today.  Please note that patients who test positive for COVID-19 are required by the CDC to undergo isolation for 10 days after their symptoms first began.     This isolation starts from the day you first developed symptoms, not the day of your positive test. For example, if your symptoms began on a Monday but tested positive on the following Wednesday, your 10-day isolation begins from that Monday, not the Wednesday you tested positive.     However, if you are asymptomatic (a person who does not have any symptoms) and COVID-19 positive, your 10-day isolation begins on the day you tested positive, regardless of  exposure date.     Also, per the CDC guidelines, once your 10 days have passed, and you have not had fever greater than 100.4F in the last 24 hours without taking any fever reducers such as Tylenol (Acetaminophen) or Motrin (Ibuprofen), you may return to your normal activities including social distancing, wearing masks, and frequent handwashing - YOU DO NOT NEED ANOTHER TEST IN ORDER TO END YOUR QUARANTINE.     My notes were dictated with [employer]. Any misspellings or nonsensical grammar should be attributed to its use and allowances made for errors and typographic syntactical error(s).

## 2020-12-19 PROBLEM — D35.2 PITUITARY ADENOMA: Chronic | Status: ACTIVE | Noted: 2019-03-29

## 2020-12-19 PROBLEM — D18.03 HEMANGIOMA OF LIVER: Chronic | Status: ACTIVE | Noted: 2020-02-20

## 2020-12-26 PROBLEM — J34.1 MUCOUS RETENTION CYST OF MAXILLARY SINUS: Chronic | Status: ACTIVE | Noted: 2020-12-26

## 2020-12-26 PROBLEM — H90.0 CONDUCTIVE HEARING LOSS, BILATERAL: Chronic | Status: ACTIVE | Noted: 2020-12-26

## 2020-12-26 PROBLEM — J30.1 SEASONAL ALLERGIC RHINITIS DUE TO POLLEN: Chronic | Status: ACTIVE | Noted: 2020-12-26

## 2021-02-05 ENCOUNTER — OFFICE VISIT (OUTPATIENT)
Dept: ENDOCRINOLOGY | Facility: CLINIC | Age: 31
End: 2021-02-05
Payer: COMMERCIAL

## 2021-02-05 ENCOUNTER — TELEPHONE (OUTPATIENT)
Dept: ENDOCRINOLOGY | Facility: CLINIC | Age: 31
End: 2021-02-05

## 2021-02-05 DIAGNOSIS — R55 SYNCOPE, UNSPECIFIED SYNCOPE TYPE: Primary | ICD-10-CM

## 2021-02-05 DIAGNOSIS — D35.2 PITUITARY MICROADENOMA: ICD-10-CM

## 2021-02-05 PROCEDURE — 99214 OFFICE O/P EST MOD 30 MIN: CPT | Mod: 95,,, | Performed by: INTERNAL MEDICINE

## 2021-02-05 PROCEDURE — 99214 PR OFFICE/OUTPT VISIT, EST, LEVL IV, 30-39 MIN: ICD-10-PCS | Mod: 95,,, | Performed by: INTERNAL MEDICINE

## 2021-02-18 ENCOUNTER — PATIENT MESSAGE (OUTPATIENT)
Dept: ENDOCRINOLOGY | Facility: CLINIC | Age: 31
End: 2021-02-18

## 2021-02-19 ENCOUNTER — PATIENT MESSAGE (OUTPATIENT)
Dept: ENDOCRINOLOGY | Facility: CLINIC | Age: 31
End: 2021-02-19

## 2021-02-22 ENCOUNTER — PATIENT MESSAGE (OUTPATIENT)
Dept: ENDOCRINOLOGY | Facility: CLINIC | Age: 31
End: 2021-02-22

## 2021-05-06 ENCOUNTER — PATIENT MESSAGE (OUTPATIENT)
Dept: RESEARCH | Facility: HOSPITAL | Age: 31
End: 2021-05-06

## 2021-05-07 ENCOUNTER — TELEPHONE (OUTPATIENT)
Dept: ENDOCRINOLOGY | Facility: CLINIC | Age: 31
End: 2021-05-07

## 2021-05-07 PROBLEM — Z86.16 HISTORY OF 2019 NOVEL CORONAVIRUS DISEASE (COVID-19): Status: ACTIVE | Noted: 2020-12-08

## 2021-05-14 ENCOUNTER — OFFICE VISIT (OUTPATIENT)
Dept: OBSTETRICS AND GYNECOLOGY | Facility: CLINIC | Age: 31
End: 2021-05-14
Payer: COMMERCIAL

## 2021-05-14 VITALS
DIASTOLIC BLOOD PRESSURE: 68 MMHG | SYSTOLIC BLOOD PRESSURE: 108 MMHG | BODY MASS INDEX: 23.77 KG/M2 | HEIGHT: 62 IN | WEIGHT: 129.19 LBS

## 2021-05-14 DIAGNOSIS — Z01.419 ENCOUNTER FOR WELL WOMAN EXAM WITH ROUTINE GYNECOLOGICAL EXAM: Primary | ICD-10-CM

## 2021-05-14 PROCEDURE — 99999 PR PBB SHADOW E&M-EST. PATIENT-LVL III: CPT | Mod: PBBFAC,,, | Performed by: OBSTETRICS & GYNECOLOGY

## 2021-05-14 PROCEDURE — 99395 PREV VISIT EST AGE 18-39: CPT | Mod: S$GLB,,, | Performed by: OBSTETRICS & GYNECOLOGY

## 2021-05-14 PROCEDURE — 1126F AMNT PAIN NOTED NONE PRSNT: CPT | Mod: S$GLB,,, | Performed by: OBSTETRICS & GYNECOLOGY

## 2021-05-14 PROCEDURE — 88175 CYTOPATH C/V AUTO FLUID REDO: CPT | Performed by: OBSTETRICS & GYNECOLOGY

## 2021-05-14 PROCEDURE — 99999 PR PBB SHADOW E&M-EST. PATIENT-LVL III: ICD-10-PCS | Mod: PBBFAC,,, | Performed by: OBSTETRICS & GYNECOLOGY

## 2021-05-14 PROCEDURE — 99395 PR PREVENTIVE VISIT,EST,18-39: ICD-10-PCS | Mod: S$GLB,,, | Performed by: OBSTETRICS & GYNECOLOGY

## 2021-05-14 PROCEDURE — 3008F BODY MASS INDEX DOCD: CPT | Mod: CPTII,S$GLB,, | Performed by: OBSTETRICS & GYNECOLOGY

## 2021-05-14 PROCEDURE — 3008F PR BODY MASS INDEX (BMI) DOCUMENTED: ICD-10-PCS | Mod: CPTII,S$GLB,, | Performed by: OBSTETRICS & GYNECOLOGY

## 2021-05-14 PROCEDURE — 1126F PR PAIN SEVERITY QUANTIFIED, NO PAIN PRESENT: ICD-10-PCS | Mod: S$GLB,,, | Performed by: OBSTETRICS & GYNECOLOGY

## 2021-05-19 LAB
FINAL PATHOLOGIC DIAGNOSIS: NORMAL
Lab: NORMAL

## 2021-05-20 ENCOUNTER — PATIENT MESSAGE (OUTPATIENT)
Dept: OBSTETRICS AND GYNECOLOGY | Facility: CLINIC | Age: 31
End: 2021-05-20

## 2021-05-20 ENCOUNTER — OFFICE VISIT (OUTPATIENT)
Dept: ENDOCRINOLOGY | Facility: CLINIC | Age: 31
End: 2021-05-20
Payer: COMMERCIAL

## 2021-05-20 VITALS
WEIGHT: 129.88 LBS | BODY MASS INDEX: 23.75 KG/M2 | HEART RATE: 82 BPM | OXYGEN SATURATION: 96 % | DIASTOLIC BLOOD PRESSURE: 76 MMHG | SYSTOLIC BLOOD PRESSURE: 99 MMHG | RESPIRATION RATE: 16 BRPM | TEMPERATURE: 99 F

## 2021-05-20 DIAGNOSIS — D35.2 PITUITARY ADENOMA: Primary | Chronic | ICD-10-CM

## 2021-05-20 DIAGNOSIS — E16.2 HYPOGLYCEMIA: ICD-10-CM

## 2021-05-20 DIAGNOSIS — H91.90 HEARING DIFFICULTY, UNSPECIFIED LATERALITY: Primary | ICD-10-CM

## 2021-05-20 PROCEDURE — 99999 PR PBB SHADOW E&M-EST. PATIENT-LVL IV: CPT | Mod: PBBFAC,,, | Performed by: INTERNAL MEDICINE

## 2021-05-20 PROCEDURE — 1126F PR PAIN SEVERITY QUANTIFIED, NO PAIN PRESENT: ICD-10-PCS | Mod: S$GLB,,, | Performed by: INTERNAL MEDICINE

## 2021-05-20 PROCEDURE — 99214 PR OFFICE/OUTPT VISIT, EST, LEVL IV, 30-39 MIN: ICD-10-PCS | Mod: S$GLB,,, | Performed by: INTERNAL MEDICINE

## 2021-05-20 PROCEDURE — 1126F AMNT PAIN NOTED NONE PRSNT: CPT | Mod: S$GLB,,, | Performed by: INTERNAL MEDICINE

## 2021-05-20 PROCEDURE — 3008F PR BODY MASS INDEX (BMI) DOCUMENTED: ICD-10-PCS | Mod: CPTII,S$GLB,, | Performed by: INTERNAL MEDICINE

## 2021-05-20 PROCEDURE — 99214 OFFICE O/P EST MOD 30 MIN: CPT | Mod: S$GLB,,, | Performed by: INTERNAL MEDICINE

## 2021-05-20 PROCEDURE — 3008F BODY MASS INDEX DOCD: CPT | Mod: CPTII,S$GLB,, | Performed by: INTERNAL MEDICINE

## 2021-05-20 PROCEDURE — 99999 PR PBB SHADOW E&M-EST. PATIENT-LVL IV: ICD-10-PCS | Mod: PBBFAC,,, | Performed by: INTERNAL MEDICINE

## 2021-05-21 ENCOUNTER — CLINICAL SUPPORT (OUTPATIENT)
Dept: AUDIOLOGY | Facility: CLINIC | Age: 31
End: 2021-05-21
Payer: COMMERCIAL

## 2021-05-21 ENCOUNTER — OFFICE VISIT (OUTPATIENT)
Dept: OTOLARYNGOLOGY | Facility: CLINIC | Age: 31
End: 2021-05-21
Payer: COMMERCIAL

## 2021-05-21 VITALS — HEIGHT: 62 IN | WEIGHT: 129.88 LBS | BODY MASS INDEX: 23.9 KG/M2

## 2021-05-21 DIAGNOSIS — H91.90 HEARING DIFFICULTY, UNSPECIFIED LATERALITY: ICD-10-CM

## 2021-05-21 DIAGNOSIS — H93.19 TINNITUS, UNSPECIFIED LATERALITY: Primary | ICD-10-CM

## 2021-05-21 DIAGNOSIS — H93.299 ABNORMAL AUDITORY PERCEPTION, UNSPECIFIED LATERALITY: ICD-10-CM

## 2021-05-21 DIAGNOSIS — H93.13 TINNITUS, BILATERAL: ICD-10-CM

## 2021-05-21 DIAGNOSIS — H91.92 MILD HEARING LOSS OF LEFT EAR: Primary | ICD-10-CM

## 2021-05-21 PROCEDURE — 92557 PR COMPREHENSIVE HEARING TEST: ICD-10-PCS | Mod: S$GLB,,, | Performed by: AUDIOLOGIST

## 2021-05-21 PROCEDURE — 99203 OFFICE O/P NEW LOW 30 MIN: CPT | Mod: S$GLB,,, | Performed by: NURSE PRACTITIONER

## 2021-05-21 PROCEDURE — 99999 PR PBB SHADOW E&M-EST. PATIENT-LVL II: ICD-10-PCS | Mod: PBBFAC,,,

## 2021-05-21 PROCEDURE — 1126F AMNT PAIN NOTED NONE PRSNT: CPT | Mod: S$GLB,,, | Performed by: NURSE PRACTITIONER

## 2021-05-21 PROCEDURE — 3008F BODY MASS INDEX DOCD: CPT | Mod: CPTII,S$GLB,, | Performed by: NURSE PRACTITIONER

## 2021-05-21 PROCEDURE — 99999 PR PBB SHADOW E&M-EST. PATIENT-LVL III: ICD-10-PCS | Mod: PBBFAC,,, | Performed by: NURSE PRACTITIONER

## 2021-05-21 PROCEDURE — 99203 PR OFFICE/OUTPT VISIT, NEW, LEVL III, 30-44 MIN: ICD-10-PCS | Mod: S$GLB,,, | Performed by: NURSE PRACTITIONER

## 2021-05-21 PROCEDURE — 92567 PR TYMPA2METRY: ICD-10-PCS | Mod: S$GLB,,, | Performed by: AUDIOLOGIST

## 2021-05-21 PROCEDURE — 1126F PR PAIN SEVERITY QUANTIFIED, NO PAIN PRESENT: ICD-10-PCS | Mod: S$GLB,,, | Performed by: NURSE PRACTITIONER

## 2021-05-21 PROCEDURE — 92567 TYMPANOMETRY: CPT | Mod: S$GLB,,, | Performed by: AUDIOLOGIST

## 2021-05-21 PROCEDURE — 92557 COMPREHENSIVE HEARING TEST: CPT | Mod: S$GLB,,, | Performed by: AUDIOLOGIST

## 2021-05-21 PROCEDURE — 99999 PR PBB SHADOW E&M-EST. PATIENT-LVL III: CPT | Mod: PBBFAC,,, | Performed by: NURSE PRACTITIONER

## 2021-05-21 PROCEDURE — 99999 PR PBB SHADOW E&M-EST. PATIENT-LVL II: CPT | Mod: PBBFAC,,,

## 2021-05-21 PROCEDURE — 3008F PR BODY MASS INDEX (BMI) DOCUMENTED: ICD-10-PCS | Mod: CPTII,S$GLB,, | Performed by: NURSE PRACTITIONER

## 2021-05-27 ENCOUNTER — TELEPHONE (OUTPATIENT)
Dept: ENDOCRINOLOGY | Facility: CLINIC | Age: 31
End: 2021-05-27

## 2021-05-27 DIAGNOSIS — E16.2 HYPOGLYCEMIA: Primary | ICD-10-CM

## 2021-05-28 ENCOUNTER — OFFICE VISIT (OUTPATIENT)
Dept: URGENT CARE | Facility: CLINIC | Age: 31
End: 2021-05-28
Payer: COMMERCIAL

## 2021-05-28 VITALS
TEMPERATURE: 101 F | DIASTOLIC BLOOD PRESSURE: 81 MMHG | OXYGEN SATURATION: 100 % | BODY MASS INDEX: 23 KG/M2 | WEIGHT: 125 LBS | RESPIRATION RATE: 16 BRPM | HEIGHT: 62 IN | SYSTOLIC BLOOD PRESSURE: 115 MMHG | HEART RATE: 87 BPM

## 2021-05-28 DIAGNOSIS — J02.9 SORE THROAT: Primary | ICD-10-CM

## 2021-05-28 LAB
CTP QC/QA: YES
CTP QC/QA: YES
MOLECULAR STREP A: NEGATIVE
SARS-COV-2 RDRP RESP QL NAA+PROBE: NEGATIVE

## 2021-05-28 PROCEDURE — 99214 OFFICE O/P EST MOD 30 MIN: CPT | Mod: S$GLB,,, | Performed by: PHYSICIAN ASSISTANT

## 2021-05-28 PROCEDURE — 1125F AMNT PAIN NOTED PAIN PRSNT: CPT | Mod: S$GLB,,, | Performed by: PHYSICIAN ASSISTANT

## 2021-05-28 PROCEDURE — U0002 COVID-19 LAB TEST NON-CDC: HCPCS | Mod: QW,S$GLB,, | Performed by: PHYSICIAN ASSISTANT

## 2021-05-28 PROCEDURE — 3008F PR BODY MASS INDEX (BMI) DOCUMENTED: ICD-10-PCS | Mod: CPTII,S$GLB,, | Performed by: PHYSICIAN ASSISTANT

## 2021-05-28 PROCEDURE — 87651 POCT STREP A MOLECULAR: ICD-10-PCS | Mod: QW,S$GLB,, | Performed by: PHYSICIAN ASSISTANT

## 2021-05-28 PROCEDURE — 87651 STREP A DNA AMP PROBE: CPT | Mod: QW,S$GLB,, | Performed by: PHYSICIAN ASSISTANT

## 2021-05-28 PROCEDURE — U0002: ICD-10-PCS | Mod: QW,S$GLB,, | Performed by: PHYSICIAN ASSISTANT

## 2021-05-28 PROCEDURE — 1125F PR PAIN SEVERITY QUANTIFIED, PAIN PRESENT: ICD-10-PCS | Mod: S$GLB,,, | Performed by: PHYSICIAN ASSISTANT

## 2021-05-28 PROCEDURE — 99214 PR OFFICE/OUTPT VISIT, EST, LEVL IV, 30-39 MIN: ICD-10-PCS | Mod: S$GLB,,, | Performed by: PHYSICIAN ASSISTANT

## 2021-05-28 PROCEDURE — 3008F BODY MASS INDEX DOCD: CPT | Mod: CPTII,S$GLB,, | Performed by: PHYSICIAN ASSISTANT

## 2021-06-03 DIAGNOSIS — E16.2 HYPOGLYCEMIA: Primary | ICD-10-CM

## 2021-06-04 ENCOUNTER — LAB VISIT (OUTPATIENT)
Dept: LAB | Facility: HOSPITAL | Age: 31
End: 2021-06-04
Attending: INTERNAL MEDICINE
Payer: COMMERCIAL

## 2021-06-04 ENCOUNTER — CLINICAL SUPPORT (OUTPATIENT)
Dept: ENDOCRINOLOGY | Facility: CLINIC | Age: 31
End: 2021-06-04
Payer: COMMERCIAL

## 2021-06-04 VITALS
SYSTOLIC BLOOD PRESSURE: 90 MMHG | RESPIRATION RATE: 20 BRPM | DIASTOLIC BLOOD PRESSURE: 68 MMHG | BODY MASS INDEX: 23.24 KG/M2 | WEIGHT: 126.31 LBS | HEART RATE: 67 BPM | TEMPERATURE: 98 F | HEIGHT: 62 IN | OXYGEN SATURATION: 98 %

## 2021-06-04 DIAGNOSIS — E16.2 HYPOGLYCEMIA: Primary | ICD-10-CM

## 2021-06-04 DIAGNOSIS — E16.2 HYPOGLYCEMIA: ICD-10-CM

## 2021-06-04 LAB
GLUCOSE SERPL-MCNC: 64 MG/DL (ref 70–110)
GLUCOSE SERPL-MCNC: 66 MG/DL (ref 70–110)
GLUCOSE SERPL-MCNC: 66 MG/DL (ref 70–110)
GLUCOSE SERPL-MCNC: 67 MG/DL (ref 70–110)
GLUCOSE SERPL-MCNC: 73 MG/DL (ref 70–110)
GLUCOSE SERPL-MCNC: 78 MG/DL (ref 70–110)
GLUCOSE SERPL-MCNC: 86 MG/DL (ref 70–110)

## 2021-06-04 PROCEDURE — 82962 POCT GLUCOSE, HAND-HELD DEVICE: ICD-10-PCS | Mod: 91,S$GLB,, | Performed by: INTERNAL MEDICINE

## 2021-06-04 PROCEDURE — 36415 COLL VENOUS BLD VENIPUNCTURE: CPT | Mod: PO | Performed by: INTERNAL MEDICINE

## 2021-06-04 PROCEDURE — 83525 ASSAY OF INSULIN: CPT | Performed by: INTERNAL MEDICINE

## 2021-06-04 PROCEDURE — 82962 GLUCOSE BLOOD TEST: CPT | Mod: 91,S$GLB,, | Performed by: INTERNAL MEDICINE

## 2021-06-04 PROCEDURE — 82533 TOTAL CORTISOL: CPT | Performed by: INTERNAL MEDICINE

## 2021-06-04 PROCEDURE — 84206 ASSAY OF PROINSULIN: CPT | Performed by: INTERNAL MEDICINE

## 2021-06-04 PROCEDURE — 99999 PR PBB SHADOW E&M-EST. PATIENT-LVL III: CPT | Mod: PBBFAC,,,

## 2021-06-04 PROCEDURE — 84681 ASSAY OF C-PEPTIDE: CPT | Performed by: INTERNAL MEDICINE

## 2021-06-04 PROCEDURE — 80048 BASIC METABOLIC PNL TOTAL CA: CPT | Performed by: INTERNAL MEDICINE

## 2021-06-04 PROCEDURE — 80307 DRUG TEST PRSMV CHEM ANLYZR: CPT | Performed by: INTERNAL MEDICINE

## 2021-06-04 PROCEDURE — 86337 INSULIN ANTIBODIES: CPT | Performed by: INTERNAL MEDICINE

## 2021-06-04 PROCEDURE — 82962 GLUCOSE BLOOD TEST: CPT | Mod: S$GLB,,, | Performed by: INTERNAL MEDICINE

## 2021-06-04 PROCEDURE — 99999 PR PBB SHADOW E&M-EST. PATIENT-LVL III: ICD-10-PCS | Mod: PBBFAC,,,

## 2021-06-05 LAB
ANION GAP SERPL CALC-SCNC: 12 MMOL/L (ref 8–16)
BUN SERPL-MCNC: 21 MG/DL (ref 6–20)
CALCIUM SERPL-MCNC: 9.8 MG/DL (ref 8.7–10.5)
CHLORIDE SERPL-SCNC: 107 MMOL/L (ref 95–110)
CO2 SERPL-SCNC: 20 MMOL/L (ref 23–29)
CORTIS SERPL-MCNC: 10.1 UG/DL
CREAT SERPL-MCNC: 0.7 MG/DL (ref 0.5–1.4)
EST. GFR  (AFRICAN AMERICAN): >60 ML/MIN/1.73 M^2
EST. GFR  (NON AFRICAN AMERICAN): >60 ML/MIN/1.73 M^2
GLUCOSE SERPL-MCNC: 60 MG/DL (ref 70–110)
POTASSIUM SERPL-SCNC: 4.3 MMOL/L (ref 3.5–5.1)
SODIUM SERPL-SCNC: 139 MMOL/L (ref 136–145)

## 2021-06-08 LAB
C PEPTIDE SERPL-MCNC: 0.49 NG/ML (ref 0.78–5.19)
INSULIN COLLECTION INTERVAL: NORMAL
INSULIN SERPL-ACNC: 2.8 UU/ML

## 2021-06-09 LAB
ACETOHEXAMIDE SERPL-MCNC: NEGATIVE NG/ML
ANNOTATION COMMENT IMP: NORMAL
CHLORPROPAMIDE SERPL-MCNC: NEGATIVE NG/ML
GLIMEPIRIDE SERPL-MCNC: NEGATIVE NG/ML
GLIPIZIDE SERPL-MCNC: NEGATIVE NG/ML
GLYBURIDE SERPL-MCNC: NEGATIVE NG/ML
INSULIN AB SER-SCNC: 0 NMOL/L (ref 0–0.02)
REPAGLINIDE SERPL-MCNC: NEGATIVE NG/ML
TOLAZAMIDE SERPL-MCNC: NORMAL NG/ML
TOLBUTAMIDE SERPL-MCNC: NEGATIVE NG/ML

## 2021-06-18 ENCOUNTER — PATIENT MESSAGE (OUTPATIENT)
Dept: ENDOCRINOLOGY | Facility: CLINIC | Age: 31
End: 2021-06-18

## 2021-06-18 DIAGNOSIS — E16.2 HYPOGLYCEMIA: Primary | ICD-10-CM

## 2021-06-23 ENCOUNTER — TELEPHONE (OUTPATIENT)
Dept: ENDOCRINOLOGY | Facility: CLINIC | Age: 31
End: 2021-06-23

## 2021-06-24 ENCOUNTER — PATIENT MESSAGE (OUTPATIENT)
Dept: ENDOCRINOLOGY | Facility: CLINIC | Age: 31
End: 2021-06-24

## 2021-06-25 ENCOUNTER — TELEPHONE (OUTPATIENT)
Dept: ENDOCRINOLOGY | Facility: CLINIC | Age: 31
End: 2021-06-25

## 2021-07-02 ENCOUNTER — PROCEDURE VISIT (OUTPATIENT)
Dept: DIABETES | Facility: CLINIC | Age: 31
End: 2021-07-02
Payer: COMMERCIAL

## 2021-07-02 DIAGNOSIS — E16.2 HYPOGLYCEMIA: Primary | ICD-10-CM

## 2021-07-09 ENCOUNTER — NUTRITION (OUTPATIENT)
Dept: DIABETES | Facility: CLINIC | Age: 31
End: 2021-07-09
Payer: COMMERCIAL

## 2021-07-09 DIAGNOSIS — E16.2 HYPOGLYCEMIA: ICD-10-CM

## 2021-07-09 PROCEDURE — 95250 CONT GLUC MNTR PHYS/QHP EQP: CPT | Mod: S$GLB,,, | Performed by: NUTRITIONIST

## 2021-07-09 PROCEDURE — 95250 PR GLUCOSE MONITORING,72 HRS,SUB-Q SENSOR: ICD-10-PCS | Mod: S$GLB,,, | Performed by: NUTRITIONIST

## 2021-07-09 PROCEDURE — 99999 PR PBB SHADOW E&M-EST. PATIENT-LVL II: CPT | Mod: PBBFAC,,, | Performed by: NUTRITIONIST

## 2021-07-09 PROCEDURE — 99999 PR PBB SHADOW E&M-EST. PATIENT-LVL II: ICD-10-PCS | Mod: PBBFAC,,, | Performed by: NUTRITIONIST

## 2021-08-11 ENCOUNTER — PATIENT MESSAGE (OUTPATIENT)
Dept: ENDOCRINOLOGY | Facility: CLINIC | Age: 31
End: 2021-08-11

## 2021-10-01 ENCOUNTER — OFFICE VISIT (OUTPATIENT)
Dept: OPTOMETRY | Facility: CLINIC | Age: 31
End: 2021-10-01
Payer: COMMERCIAL

## 2021-10-01 DIAGNOSIS — H52.203 MYOPIA OF BOTH EYES WITH ASTIGMATISM: Primary | ICD-10-CM

## 2021-10-01 DIAGNOSIS — H52.13 MYOPIA OF BOTH EYES WITH ASTIGMATISM: Primary | ICD-10-CM

## 2021-10-01 DIAGNOSIS — Z83.518 FAMILY HISTORY OF RETINAL DETACHMENT: ICD-10-CM

## 2021-10-01 DIAGNOSIS — Z97.3 WEARS CONTACT LENSES: ICD-10-CM

## 2021-10-01 PROCEDURE — 92015 PR REFRACTION: ICD-10-PCS | Mod: S$GLB,,, | Performed by: OPTOMETRIST

## 2021-10-01 PROCEDURE — 92015 DETERMINE REFRACTIVE STATE: CPT | Mod: S$GLB,,, | Performed by: OPTOMETRIST

## 2021-10-01 PROCEDURE — 1159F MED LIST DOCD IN RCRD: CPT | Mod: CPTII,S$GLB,, | Performed by: OPTOMETRIST

## 2021-10-01 PROCEDURE — 92004 COMPRE OPH EXAM NEW PT 1/>: CPT | Mod: S$GLB,,, | Performed by: OPTOMETRIST

## 2021-10-01 PROCEDURE — 92004 PR EYE EXAM, NEW PATIENT,COMPREHESV: ICD-10-PCS | Mod: S$GLB,,, | Performed by: OPTOMETRIST

## 2021-10-01 PROCEDURE — 99999 PR PBB SHADOW E&M-EST. PATIENT-LVL II: CPT | Mod: PBBFAC,,, | Performed by: OPTOMETRIST

## 2021-10-01 PROCEDURE — 1160F RVW MEDS BY RX/DR IN RCRD: CPT | Mod: CPTII,S$GLB,, | Performed by: OPTOMETRIST

## 2021-10-01 PROCEDURE — 99499 NO LOS: ICD-10-PCS | Mod: S$GLB,,, | Performed by: OPTOMETRIST

## 2021-10-01 PROCEDURE — 99999 PR PBB SHADOW E&M-EST. PATIENT-LVL III: CPT | Mod: PBBFAC,,, | Performed by: OPTOMETRIST

## 2021-10-01 PROCEDURE — 99499 UNLISTED E&M SERVICE: CPT | Mod: S$GLB,,, | Performed by: OPTOMETRIST

## 2021-10-01 PROCEDURE — 99999 PR PBB SHADOW E&M-EST. PATIENT-LVL III: ICD-10-PCS | Mod: PBBFAC,,, | Performed by: OPTOMETRIST

## 2021-10-01 PROCEDURE — 1160F PR REVIEW ALL MEDS BY PRESCRIBER/CLIN PHARMACIST DOCUMENTED: ICD-10-PCS | Mod: CPTII,S$GLB,, | Performed by: OPTOMETRIST

## 2021-10-01 PROCEDURE — 99999 PR PBB SHADOW E&M-EST. PATIENT-LVL II: ICD-10-PCS | Mod: PBBFAC,,, | Performed by: OPTOMETRIST

## 2021-10-01 PROCEDURE — 1159F PR MEDICATION LIST DOCUMENTED IN MEDICAL RECORD: ICD-10-PCS | Mod: CPTII,S$GLB,, | Performed by: OPTOMETRIST

## 2021-10-02 ENCOUNTER — PATIENT MESSAGE (OUTPATIENT)
Dept: OPTOMETRY | Facility: CLINIC | Age: 31
End: 2021-10-02

## 2021-10-25 ENCOUNTER — TELEPHONE (OUTPATIENT)
Dept: ENDOCRINOLOGY | Facility: CLINIC | Age: 31
End: 2021-10-25
Payer: COMMERCIAL

## 2021-10-25 ENCOUNTER — PATIENT MESSAGE (OUTPATIENT)
Dept: ENDOCRINOLOGY | Facility: CLINIC | Age: 31
End: 2021-10-25
Payer: COMMERCIAL

## 2021-10-27 ENCOUNTER — TELEPHONE (OUTPATIENT)
Dept: ENDOCRINOLOGY | Facility: CLINIC | Age: 31
End: 2021-10-27
Payer: COMMERCIAL

## 2021-10-27 ENCOUNTER — PATIENT MESSAGE (OUTPATIENT)
Dept: ENDOCRINOLOGY | Facility: CLINIC | Age: 31
End: 2021-10-27
Payer: COMMERCIAL

## 2022-05-10 PROBLEM — T63.301A SPIDER BITE WOUND: Status: ACTIVE | Noted: 2022-05-10

## 2022-06-13 ENCOUNTER — OFFICE VISIT (OUTPATIENT)
Dept: OBSTETRICS AND GYNECOLOGY | Facility: CLINIC | Age: 32
End: 2022-06-13
Payer: COMMERCIAL

## 2022-06-13 VITALS
WEIGHT: 146.63 LBS | BODY MASS INDEX: 26.98 KG/M2 | SYSTOLIC BLOOD PRESSURE: 102 MMHG | HEIGHT: 62 IN | DIASTOLIC BLOOD PRESSURE: 60 MMHG

## 2022-06-13 DIAGNOSIS — Z01.419 GYNECOLOGIC EXAM NORMAL: Primary | ICD-10-CM

## 2022-06-13 PROCEDURE — 3078F DIAST BP <80 MM HG: CPT | Mod: CPTII,S$GLB,, | Performed by: OBSTETRICS & GYNECOLOGY

## 2022-06-13 PROCEDURE — 3008F PR BODY MASS INDEX (BMI) DOCUMENTED: ICD-10-PCS | Mod: CPTII,S$GLB,, | Performed by: OBSTETRICS & GYNECOLOGY

## 2022-06-13 PROCEDURE — 99999 PR PBB SHADOW E&M-EST. PATIENT-LVL III: CPT | Mod: PBBFAC,,, | Performed by: OBSTETRICS & GYNECOLOGY

## 2022-06-13 PROCEDURE — 88175 CYTOPATH C/V AUTO FLUID REDO: CPT | Performed by: OBSTETRICS & GYNECOLOGY

## 2022-06-13 PROCEDURE — 1159F MED LIST DOCD IN RCRD: CPT | Mod: CPTII,S$GLB,, | Performed by: OBSTETRICS & GYNECOLOGY

## 2022-06-13 PROCEDURE — 3078F PR MOST RECENT DIASTOLIC BLOOD PRESSURE < 80 MM HG: ICD-10-PCS | Mod: CPTII,S$GLB,, | Performed by: OBSTETRICS & GYNECOLOGY

## 2022-06-13 PROCEDURE — 1159F PR MEDICATION LIST DOCUMENTED IN MEDICAL RECORD: ICD-10-PCS | Mod: CPTII,S$GLB,, | Performed by: OBSTETRICS & GYNECOLOGY

## 2022-06-13 PROCEDURE — 99999 PR PBB SHADOW E&M-EST. PATIENT-LVL III: ICD-10-PCS | Mod: PBBFAC,,, | Performed by: OBSTETRICS & GYNECOLOGY

## 2022-06-13 PROCEDURE — 3008F BODY MASS INDEX DOCD: CPT | Mod: CPTII,S$GLB,, | Performed by: OBSTETRICS & GYNECOLOGY

## 2022-06-13 PROCEDURE — 3074F SYST BP LT 130 MM HG: CPT | Mod: CPTII,S$GLB,, | Performed by: OBSTETRICS & GYNECOLOGY

## 2022-06-13 PROCEDURE — 99395 PR PREVENTIVE VISIT,EST,18-39: ICD-10-PCS | Mod: S$GLB,,, | Performed by: OBSTETRICS & GYNECOLOGY

## 2022-06-13 PROCEDURE — 99395 PREV VISIT EST AGE 18-39: CPT | Mod: S$GLB,,, | Performed by: OBSTETRICS & GYNECOLOGY

## 2022-06-13 PROCEDURE — 3074F PR MOST RECENT SYSTOLIC BLOOD PRESSURE < 130 MM HG: ICD-10-PCS | Mod: CPTII,S$GLB,, | Performed by: OBSTETRICS & GYNECOLOGY

## 2022-06-13 NOTE — PROGRESS NOTES
Chief Complaint   Patient presents with    Well Woman       History and Physical:  Patient's last menstrual period was 2022.       Kaci Handley is a 31 y.o.   female who presents today for her routine annual GYN exam. The patient has no Gynecology complaints today. Menses monthly with IUD- counseled.       Allergies: Review of patient's allergies indicates:  No Known Allergies    Past Medical History:   Diagnosis Date    Abdominal pain, RLQ (right lower quadrant) 3/4/2013    Abnormal Pap smear of vagina     Allergy     Anemia     History of 2019 novel coronavirus disease (COVID-19) 2020    Hyperglycemia     Pituitary adenoma     Thyroid disease     thyroid level fluctuate    Well female exam with routine gynecological exam 2012       Past Surgical History:   Procedure Laterality Date    COLONOSCOPY  2017    F Rabito    CYST REMOVAL      left wrist-benign    ENDOSCOPIC NASAL SEPTOPLASTY Bilateral 2018    Procedure: SEPTOPLASTY, NOSE, ENDOSCOPIC;  Surgeon: Sam Robin MD;  Location: [facility] OR;  Service: ENT;  Laterality: Bilateral;    ENDOSCOPIC NASAL SEPTOPLASTY  2020    FUNCTIONAL ENDOSCOPIC SINUS SURGERY (FESS) USING COMPUTER-ASSISTED NAVIGATION Bilateral 2018    Procedure: FESS, USING COMPUTER-ASSISTED NAVIGATION;  Surgeon: Sam Robin MD;  Location: [facility] OR;  Service: ENT;  Laterality: Bilateral;    NASAL RECONSTRUCTION  2020    with graft    NASAL TURBINATE REDUCTION Bilateral 2018    Procedure: REDUCTION, NASAL TURBINATE;  Surgeon: Sam Robin MD;  Location: [facility] OR;  Service: ENT;  Laterality: Bilateral;    NASAL TURBINATE REDUCTION  2020    TONSILLECTOMY  1998    WISDOM TOOTH EXTRACTION         MEDS:   Current Outpatient Medications on File Prior to Visit   Medication Sig Dispense Refill    ascorbic acid, vitamin C, (VITAMIN C) 1000 MG tablet Take 2,000 mg by mouth once  daily.      azelastine (ASTELIN) 137 mcg (0.1 %) nasal spray 1 spray (137 mcg total) by Nasal route 2 (two) times daily. 120 mL 0    DULoxetine (CYMBALTA) 30 MG capsule Take 2 capsules (60 mg total) by mouth once daily. 180 capsule 3    ergocalciferol, vitamin D2, (VITAMIN D ORAL) Take 1,000 Units by mouth.      levocetirizine (XYZAL) 5 MG tablet Take 1 tablet (5 mg total) by mouth every evening. 90 tablet 3    OLANZapine (ZYPREXA) 5 MG tablet Take 1 tablet (5 mg total) by mouth every evening. 90 tablet 3    ondansetron (ZOFRAN-ODT) 8 MG TbDL Take 1 tablet (8 mg total) by mouth 3 (three) times daily as needed (nauea or vomiting). 20 tablet 0    brompheniramine-pseudoeph-DM (BROMFED DM) 2-30-10 mg/5 mL Syrp Take 2 tsp by mouth every 4-6 hours as needed cough, congestion, and/or runny nose. 118 mL 0    DUPIXENT  mg/2 mL PnIj       fluticasone propionate (FLONASE) 50 mcg/actuation nasal spray 2 sprays (100 mcg total) by Each Nostril route once daily. 15.8 mL 99    predniSONE (DELTASONE) 10 MG tablet 2 tab first 2 days then 1 daily; take in a.m 10 tablet 0    ZINC ACETATE ORAL        No current facility-administered medications on file prior to visit.       OB History        0    Para        Term                AB        Living           SAB        IAB        Ectopic        Multiple        Live Births                     Social History     Socioeconomic History    Marital status:      Spouse name: Dallas Handley    Number of children: 0   Occupational History    Occupation: medical assistant     Employer: OTHER     Comment: [employer]   Tobacco Use    Smoking status: Former Smoker     Packs/day: 0.25     Years: 3.00     Pack years: 0.75     Types: Cigarettes     Quit date: 10/21/2018     Years since quitting: 3.6    Smokeless tobacco: Never Used   Substance and Sexual Activity    Alcohol use: No    Drug use: Never    Sexual activity: Yes     Partners: Male     Birth  control/protection: Condom, I.U.D.     Social Determinants of Health     Financial Resource Strain: Low Risk     Difficulty of Paying Living Expenses: Not hard at all   Food Insecurity: No Food Insecurity    Worried About Running Out of Food in the Last Year: Never true    Ran Out of Food in the Last Year: Never true   Transportation Needs: No Transportation Needs    Lack of Transportation (Medical): No    Lack of Transportation (Non-Medical): No   Physical Activity: Sufficiently Active    Days of Exercise per Week: 5 days    Minutes of Exercise per Session: 30 min   Stress: No Stress Concern Present    Feeling of Stress : Only a little   Social Connections: Unknown    Frequency of Communication with Friends and Family: More than three times a week    Frequency of Social Gatherings with Friends and Family: Once a week    Active Member of Clubs or Organizations: No    Attends Club or Organization Meetings: Never    Marital Status:    Housing Stability: Low Risk     Unable to Pay for Housing in the Last Year: No    Number of Places Lived in the Last Year: 1    Unstable Housing in the Last Year: No       Family History   Problem Relation Age of Onset    Breast cancer Maternal Grandmother     Cancer Maternal Grandmother     ADD / ADHD Mother     Bipolar disorder Mother     Allergies Mother     Drug abuse Mother     Retinal detachment Mother     Diabetes Maternal Grandfather     Crohn's disease Father     Drug abuse Father     Alcohol abuse Father     Diabetes Paternal Grandmother     COPD Paternal Grandfather     No Known Problems Sister     Ovarian cancer Neg Hx          Past medical and surgical history reviewed.   I have reviewed the patient's medical history in detail and updated the computerized patient record.        Review of System:   General: no chills, fever, night sweats, weight gain or weight loss  Psychological: no depression or suicidal ideation  Breasts: no new or  "changing breast lumps, nipple discharge or masses.  Respiratory: no cough, shortness of breath, or wheezing  Cardiovascular: no chest pain or dyspnea on exertion  Gastrointestinal: no abdominal pain, change in bowel habits, or black or bloody stools  Genito-Urinary: no incontinence, urinary frequency/urgency or vulvar/vaginal symptoms, pelvic pain or abnormal vaginal bleeding.  Musculoskeletal: no gait disturbance or muscular weakness      Physical Exam:   /60   Ht 5' 2" (1.575 m)   Wt 66.5 kg (146 lb 9.7 oz)   LMP 05/26/2022   BMI 26.81 kg/m²   Constitutional: She appears alert and responsive. She appears well-developed, well-groomed, and well-nourished. No distress. Normal weight.   HENT:   Head: Normocephalic and atraumatic.   Eyes: Conjunctivae and EOM are normal. No scleral icterus.   Neck: Symmetrical. Normal range of motion. Neck supple. No tracheal deviation present.   Cardiovascular: Normal rate, no rhythm abnormality noted. Extremities without swelling or edema, warm.    Pulmonary/Chest: Normal respiratory Effort. No distress or retractions. She exhibits no tenderness.  Breasts: are symmetrical.   Right breast exhibits no inverted nipple, no mass, no nipple discharge, no skin change and no tenderness.   Left breast exhibits no inverted nipple, no mass, no nipple discharge, no skin change and no tenderness.  Abdominal: Soft. She exhibits no distension, hernias or masses. There is no tenderness. No enlargement of liver edge or spleen.  There is no rebound and no guarding.   Genitourinary:    External rectal exam shows no thrombosed external hemorrhoids, no lesions.     Pelvic exam was performed with patient supine.   No labial fusion, and symmetrical.    There is no rash, lesion or injury on the right labia.   There is no rash, lesion or injury on the left labia.   No bleeding and no signs of injury around the vaginal introitus, urethral meatus is normal size and without prolapse or lesions, " urethra well supported. The cervix is visualized with no discharge, lesions or friability. iud string in place.   No vaginal discharge found.    No significant Cystocele, Enterocele or rectocele, and cervix and uterus well supported.   Bimanual exam:   The urethra is normal to palpation and there are no palpable vaginal wall masses.   Uterus is not deviated, not enlarged, not fixed, normal shape and not tender.   Cervix exhibits no motion tenderness.    Right adnexum displays no mass or nodularity and no tenderness.   Left adnexum displays no mass or nodularity and no tenderness.  Musculoskeletal: Normal range of motion.   Lymphadenopathy: No inguinal adenopathy present.   Neurological: She is alert and oriented to person, place, and time. Coordination normal.   Skin: Skin is warm and dry. She is not diaphoretic. No rashes, lesions or ulcers.   Psychiatric: She has a normal mood and affect, oriented to person, place, and time.      Assessment:   Normal annual GYN exam  1. Gynecologic exam normal  Liquid-Based Pap Smear, Screening    CBC Without Differential    Basic Metabolic Panel    TSH    Cholesterol, Total   french IUD - doing well    Plan:   PAP  Replace IUD 3/2023  Follow up in 1 year.  Patient informed will be contacted with results within 2 weeks. Encouraged to please call back or email if she has not heard from us by then.

## 2022-07-01 PROBLEM — N94.4 PRIMARY DYSMENORRHEA: Status: ACTIVE | Noted: 2022-07-01

## 2022-10-26 ENCOUNTER — TELEPHONE (OUTPATIENT)
Dept: OBSTETRICS AND GYNECOLOGY | Facility: CLINIC | Age: 32
End: 2022-10-26
Payer: COMMERCIAL

## 2022-10-26 ENCOUNTER — OFFICE VISIT (OUTPATIENT)
Dept: OBSTETRICS AND GYNECOLOGY | Facility: CLINIC | Age: 32
End: 2022-10-26
Payer: COMMERCIAL

## 2022-10-26 VITALS
DIASTOLIC BLOOD PRESSURE: 64 MMHG | BODY MASS INDEX: 26.78 KG/M2 | SYSTOLIC BLOOD PRESSURE: 100 MMHG | WEIGHT: 145.5 LBS | HEIGHT: 62 IN

## 2022-10-26 DIAGNOSIS — R30.0 DYSURIA: Primary | ICD-10-CM

## 2022-10-26 PROCEDURE — 3074F SYST BP LT 130 MM HG: CPT | Mod: CPTII,S$GLB,, | Performed by: OBSTETRICS & GYNECOLOGY

## 2022-10-26 PROCEDURE — 99999 PR PBB SHADOW E&M-EST. PATIENT-LVL III: ICD-10-PCS | Mod: PBBFAC,,, | Performed by: OBSTETRICS & GYNECOLOGY

## 2022-10-26 PROCEDURE — 99999 PR PBB SHADOW E&M-EST. PATIENT-LVL III: CPT | Mod: PBBFAC,,, | Performed by: OBSTETRICS & GYNECOLOGY

## 2022-10-26 PROCEDURE — 3078F PR MOST RECENT DIASTOLIC BLOOD PRESSURE < 80 MM HG: ICD-10-PCS | Mod: CPTII,S$GLB,, | Performed by: OBSTETRICS & GYNECOLOGY

## 2022-10-26 PROCEDURE — 99213 PR OFFICE/OUTPT VISIT, EST, LEVL III, 20-29 MIN: ICD-10-PCS | Mod: S$GLB,,, | Performed by: OBSTETRICS & GYNECOLOGY

## 2022-10-26 PROCEDURE — 3078F DIAST BP <80 MM HG: CPT | Mod: CPTII,S$GLB,, | Performed by: OBSTETRICS & GYNECOLOGY

## 2022-10-26 PROCEDURE — 1159F MED LIST DOCD IN RCRD: CPT | Mod: CPTII,S$GLB,, | Performed by: OBSTETRICS & GYNECOLOGY

## 2022-10-26 PROCEDURE — 1159F PR MEDICATION LIST DOCUMENTED IN MEDICAL RECORD: ICD-10-PCS | Mod: CPTII,S$GLB,, | Performed by: OBSTETRICS & GYNECOLOGY

## 2022-10-26 PROCEDURE — 3074F PR MOST RECENT SYSTOLIC BLOOD PRESSURE < 130 MM HG: ICD-10-PCS | Mod: CPTII,S$GLB,, | Performed by: OBSTETRICS & GYNECOLOGY

## 2022-10-26 PROCEDURE — 99213 OFFICE O/P EST LOW 20 MIN: CPT | Mod: S$GLB,,, | Performed by: OBSTETRICS & GYNECOLOGY

## 2022-10-26 PROCEDURE — 87086 URINE CULTURE/COLONY COUNT: CPT | Performed by: OBSTETRICS & GYNECOLOGY

## 2022-10-26 RX ORDER — PHENAZOPYRIDINE HYDROCHLORIDE 200 MG/1
200 TABLET, FILM COATED ORAL 3 TIMES DAILY PRN
Qty: 12 TABLET | Refills: 0 | Status: SHIPPED | OUTPATIENT
Start: 2022-10-26 | End: 2022-10-26 | Stop reason: SDUPTHER

## 2022-10-26 RX ORDER — PHENAZOPYRIDINE HYDROCHLORIDE 200 MG/1
200 TABLET, FILM COATED ORAL 3 TIMES DAILY PRN
Qty: 12 TABLET | Refills: 0 | Status: SHIPPED | OUTPATIENT
Start: 2022-10-26 | End: 2022-11-05

## 2022-10-26 NOTE — TELEPHONE ENCOUNTER
----- Message from Annamaria Jean Baptiste sent at 10/26/2022 11:57 AM CDT -----  Contact: Self  Type:  Sooner Appointment Request    Caller is requesting a sooner appointment.  Caller declined first available appointment listed below.  Caller will not accept being placed on the waitlist and is requesting a message be sent to doctor.    Name of Caller:  Patient  When is the first available appointment?  12/16  Symptoms:  Having issues w. IUD, pain & uti symptoms, but negative cultures  Best Call Back Number:  [phone]  Additional Information:  Please call pt back to schedule. Please leave a v.m. if she cannot answer. Thank You.

## 2022-10-26 NOTE — PROGRESS NOTES
"  Chief Complaint   Patient presents with    Vaginal Pain    IUD Check       History of Present Illness   31 y.o.  female   patient presents today for 1 week pelvic pressure / pains - ucx neg last week.   No abnormal Vaginal Bleeding - occ sticking pain with IUD- counseled.     Past medical and surgical history reviewed.   I have reviewed the patient's medical history in detail and updated the computerized patient record.    Review of patient's allergies indicates:  No Known Allergies      Review of Systems - neg  GEN ROS: negative for - chills or fever  Breast ROS: negative for breast lumps  Genito-Urinary ROS: no dysuria, trouble voiding, or hematuria      Physical Examination:  /64   Ht 5' 2" (1.575 m)   Wt 66 kg (145 lb 8.1 oz)   LMP 2022   BMI 26.61 kg/m²    Constitutional: She appears alert and responsive. She appears well-developed, well-groomed, and well-nourished. No distress. Thin  HENT:   Head: Normocephalic and atraumatic.   Eyes: Conjunctivae and EOM are normal. No scleral icterus.   Neck: Symmetrical. Normal range of motion. Neck supple. No tracheal deviation present. THYROID: without masses or tenderness.  Cardiovascular: Normal rate, no rhythm abnormality noted. Extremities without swelling or edema, warm.    Pulmonary/Chest: Normal respiratory Effort. No distress or retractions. She exhibits no tenderness.  Abdominal: Soft. She exhibits no distension, hernias or masses. There is no tenderness. No enlargement of liver edge or spleen.  There is no rebound and no guarding.   Genitourinary:    External rectal exam shows no thrombosed external hemorrhoids, no lesions.     Pelvic exam was performed with patient supine.   No labial fusion, and symmetrical.    There is no rash, lesion or injury on the right labia.   There is no rash, lesion or injury on the left labia.   No bleeding and no signs of injury around the vaginal introitus, urethral meatus is normal size and without " prolapse or lesions, urethra well supported. The cervix is visualized with no discharge, lesions or friability. Iud string trimmed   No vaginal discharge found.    No significant Cystocele, Enterocele or rectocele, and cervix and uterus well supported.   Bimanual exam:   The urethra is normal to palpation and there are no palpable vaginal wall masses.   Uterus is not deviated, not enlarged, not fixed, normal shape and not tender. Tender over bladder 2+   Cervix exhibits no motion tenderness.    Right adnexum displays no mass or nodularity and no tenderness.   Left adnexum displays no mass or nodularity and no tenderness.  Musculoskeletal: Normal range of motion.   Lymphadenopathy: No inguinal adenopathy present.   Neurological: She is alert and oriented to person, place, and time. Coordination normal.   Skin: Skin is warm and dry. She is not diaphoretic. No rashes, lesions or ulcers.   Psychiatric: She has a normal mood and affect, oriented to person, place, and time.           Assessment:  Bladder pain  Sticking with IUD  1. Dysuria  phenazopyridine (PYRIDIUM) 200 MG tablet    DISCONTINUED: phenazopyridine (PYRIDIUM) 200 MG tablet          Plan:  Iud string trimmed  Urine cx  Pyridium, treat bladder if infection- if continued pain recommended urology eval.  Patient informed will be contacted with results within 2 weeks. Encouraged to please call back or email if she has not heard from us by then.

## 2022-10-30 LAB — BACTERIA UR CULT: NO GROWTH

## 2022-11-19 PROBLEM — G47.00 INSOMNIA: Chronic | Status: ACTIVE | Noted: 2018-03-30

## 2022-11-19 PROBLEM — F41.9 ANXIETY: Chronic | Status: ACTIVE | Noted: 2020-01-20

## 2022-11-19 PROBLEM — N94.4 PRIMARY DYSMENORRHEA: Chronic | Status: ACTIVE | Noted: 2022-07-01

## 2022-11-24 PROBLEM — F33.41 RECURRENT MAJOR DEPRESSIVE DISORDER, IN PARTIAL REMISSION: Status: ACTIVE | Noted: 2022-11-24

## 2023-02-17 ENCOUNTER — OFFICE VISIT (OUTPATIENT)
Dept: OPTOMETRY | Facility: CLINIC | Age: 33
End: 2023-02-17
Payer: COMMERCIAL

## 2023-02-17 DIAGNOSIS — H52.203 MYOPIA OF BOTH EYES WITH ASTIGMATISM: Primary | ICD-10-CM

## 2023-02-17 DIAGNOSIS — H52.13 MYOPIA OF BOTH EYES WITH ASTIGMATISM: Primary | ICD-10-CM

## 2023-02-17 DIAGNOSIS — Z83.518 FAMILY HISTORY OF RETINAL DETACHMENT: ICD-10-CM

## 2023-02-17 PROCEDURE — 92015 DETERMINE REFRACTIVE STATE: CPT | Mod: S$GLB,,, | Performed by: OPTOMETRIST

## 2023-02-17 PROCEDURE — 99999 PR PBB SHADOW E&M-EST. PATIENT-LVL III: CPT | Mod: PBBFAC,,, | Performed by: OPTOMETRIST

## 2023-02-17 PROCEDURE — 92015 PR REFRACTION: ICD-10-PCS | Mod: S$GLB,,, | Performed by: OPTOMETRIST

## 2023-02-17 PROCEDURE — 99499 UNLISTED E&M SERVICE: CPT | Mod: S$GLB,,, | Performed by: OPTOMETRIST

## 2023-02-17 PROCEDURE — 99999 PR PBB SHADOW E&M-EST. PATIENT-LVL III: ICD-10-PCS | Mod: PBBFAC,,, | Performed by: OPTOMETRIST

## 2023-02-17 PROCEDURE — 92014 COMPRE OPH EXAM EST PT 1/>: CPT | Mod: S$GLB,,, | Performed by: OPTOMETRIST

## 2023-02-17 PROCEDURE — 99499 NO LOS: ICD-10-PCS | Mod: S$GLB,,, | Performed by: OPTOMETRIST

## 2023-02-17 PROCEDURE — 92014 PR EYE EXAM, EST PATIENT,COMPREHESV: ICD-10-PCS | Mod: S$GLB,,, | Performed by: OPTOMETRIST

## 2023-02-17 RX ORDER — CLOBETASOL PROPIONATE 0.46 MG/ML
SOLUTION TOPICAL
COMMUNITY
Start: 2022-12-20 | End: 2023-12-06

## 2023-02-17 NOTE — PROGRESS NOTES
HPI    Pt here for annual eye exam DLS- 10/01/21    Pt states her vision is stable, needs updated rx.   Having problems with dry eyes, using OTC refresh with no relief.   Denies HTN, DM , floaters and FOL.   Last edited by Desire Jacobo on 2/17/2023  3:25 PM.            Assessment /Plan     For exam results, see Encounter Report.    Myopia of both eyes with astigmatism    Family history of retinal detachment      Spectacle Rx given and Contact lens Rx released to pt. Daily wear only advised, with education to risks of extended wear.  Discussed lens care, compliance and solutions. RTC yearly contact lens follow up.  , discussed different options for glasses. RTC 1 year routine eye exam.

## 2023-02-17 NOTE — PROGRESS NOTES
Assessment /Plan     For exam results, see Encounter Report.    Myopia of both eyes with astigmatism      Contact lens Rx released to pt. Daily wear only advised, with education to risks of extended wear.  Discussed lens care, compliance and solutions. RTC yearly contact lens follow up.

## 2023-02-20 ENCOUNTER — PATIENT MESSAGE (OUTPATIENT)
Dept: OPTOMETRY | Facility: CLINIC | Age: 33
End: 2023-02-20
Payer: COMMERCIAL

## 2023-03-08 ENCOUNTER — OFFICE VISIT (OUTPATIENT)
Dept: OBSTETRICS AND GYNECOLOGY | Facility: CLINIC | Age: 33
End: 2023-03-08
Payer: COMMERCIAL

## 2023-03-08 ENCOUNTER — PATIENT MESSAGE (OUTPATIENT)
Dept: OBSTETRICS AND GYNECOLOGY | Facility: CLINIC | Age: 33
End: 2023-03-08
Payer: COMMERCIAL

## 2023-03-08 VITALS
DIASTOLIC BLOOD PRESSURE: 74 MMHG | SYSTOLIC BLOOD PRESSURE: 92 MMHG | HEIGHT: 63 IN | WEIGHT: 142 LBS | BODY MASS INDEX: 25.16 KG/M2

## 2023-03-08 DIAGNOSIS — Z30.433 ENCOUNTER FOR IUD REMOVAL AND REINSERTION: Primary | ICD-10-CM

## 2023-03-08 DIAGNOSIS — Z30.432 ENCOUNTER FOR IUD REMOVAL: ICD-10-CM

## 2023-03-08 PROCEDURE — 88300 SURGICAL PATH GROSS: CPT | Mod: 26,,, | Performed by: PATHOLOGY

## 2023-03-08 PROCEDURE — 58301 REMOVE INTRAUTERINE DEVICE: CPT | Mod: S$GLB,,, | Performed by: OBSTETRICS & GYNECOLOGY

## 2023-03-08 PROCEDURE — 1159F MED LIST DOCD IN RCRD: CPT | Mod: CPTII,S$GLB,, | Performed by: OBSTETRICS & GYNECOLOGY

## 2023-03-08 PROCEDURE — 3008F BODY MASS INDEX DOCD: CPT | Mod: CPTII,S$GLB,, | Performed by: OBSTETRICS & GYNECOLOGY

## 2023-03-08 PROCEDURE — 58301 PR REMOVE, INTRAUTERINE DEVICE: ICD-10-PCS | Mod: S$GLB,,, | Performed by: OBSTETRICS & GYNECOLOGY

## 2023-03-08 PROCEDURE — 99499 NO LOS: ICD-10-PCS | Mod: S$GLB,,, | Performed by: OBSTETRICS & GYNECOLOGY

## 2023-03-08 PROCEDURE — 88300 PR  SURG PATH,GROSS,LEVEL I: ICD-10-PCS | Mod: 26,,, | Performed by: PATHOLOGY

## 2023-03-08 PROCEDURE — 99999 PR PBB SHADOW E&M-EST. PATIENT-LVL III: ICD-10-PCS | Mod: PBBFAC,,, | Performed by: OBSTETRICS & GYNECOLOGY

## 2023-03-08 PROCEDURE — 3074F SYST BP LT 130 MM HG: CPT | Mod: CPTII,S$GLB,, | Performed by: OBSTETRICS & GYNECOLOGY

## 2023-03-08 PROCEDURE — 3008F PR BODY MASS INDEX (BMI) DOCUMENTED: ICD-10-PCS | Mod: CPTII,S$GLB,, | Performed by: OBSTETRICS & GYNECOLOGY

## 2023-03-08 PROCEDURE — 1159F PR MEDICATION LIST DOCUMENTED IN MEDICAL RECORD: ICD-10-PCS | Mod: CPTII,S$GLB,, | Performed by: OBSTETRICS & GYNECOLOGY

## 2023-03-08 PROCEDURE — 3078F DIAST BP <80 MM HG: CPT | Mod: CPTII,S$GLB,, | Performed by: OBSTETRICS & GYNECOLOGY

## 2023-03-08 PROCEDURE — 3074F PR MOST RECENT SYSTOLIC BLOOD PRESSURE < 130 MM HG: ICD-10-PCS | Mod: CPTII,S$GLB,, | Performed by: OBSTETRICS & GYNECOLOGY

## 2023-03-08 PROCEDURE — 88300 SURGICAL PATH GROSS: CPT | Performed by: PATHOLOGY

## 2023-03-08 PROCEDURE — 3078F PR MOST RECENT DIASTOLIC BLOOD PRESSURE < 80 MM HG: ICD-10-PCS | Mod: CPTII,S$GLB,, | Performed by: OBSTETRICS & GYNECOLOGY

## 2023-03-08 PROCEDURE — 99999 PR PBB SHADOW E&M-EST. PATIENT-LVL III: CPT | Mod: PBBFAC,,, | Performed by: OBSTETRICS & GYNECOLOGY

## 2023-03-08 PROCEDURE — 99499 UNLISTED E&M SERVICE: CPT | Mod: S$GLB,,, | Performed by: OBSTETRICS & GYNECOLOGY

## 2023-03-08 RX ORDER — LINACLOTIDE 72 UG/1
72 CAPSULE, GELATIN COATED ORAL EVERY MORNING
COMMUNITY
Start: 2023-02-24

## 2023-03-08 NOTE — PROGRESS NOTES
History of Present Illness:   Pateint presents today  For IUD removal,planing pregnancy- counseled.    Pathology: none    Physical exam:  /78   Wt 82 kg (180 lb 12.4 oz)   BMI 36.51 kg/m²   Constitutional: She is oriented to person, place, and time. She appears well-developed and well-nourished. No distress.   HENT:   Head: Normocephalic and atraumatic.   Eyes: Conjunctivae and EOM are normal. No scleral icterus.   Neck: Normal range of motion. Neck supple. No tracheal deviation present.   Cardiovascular: Normal rate.    Pulmonary/Chest: Effort normal. No respiratory distress. She exhibits no tenderness.  Breasts: deferred  Abdominal: Soft. She exhibits no distension and no mass. There is no rebound and no guarding.   Genitourinary:     External rectal exam shows no thrombosed external hemorrhoids.    Pelvic exam was performed with patient supine.   There is no rash, lesion or injury on the right labia.   There is no rash, lesion or injury on the left labia.   No bleeding and no signs of injury around the vaginal introitus, urethra is without lesions and well supported. The cervix is visualized with no discharge, lesions or friability. Intrauterine Device string easily visualized, IUD removed with ring forceps without difficulty, tolerated well.    No vaginal discharge found.    No significant Cystocele, Enterocele or rectocele, and uterus well supported.   Bimanual exam: deferred  Neurological: She is alert and oriented to person, place, and time. Coordination normal.   Skin: Skin is warm and dry. She is not diaphoretic.   Psychiatric: She has a normal mood and affect.    Assessment:  IUD removal today.    Plan:  Resume normal activity and follow up as scheduled for routine screening.   Pnv, avoid maxalt with positive pregnancy test

## 2023-03-28 LAB
FINAL PATHOLOGIC DIAGNOSIS: NORMAL
GROSS: NORMAL
Lab: NORMAL

## 2023-04-17 ENCOUNTER — PATIENT MESSAGE (OUTPATIENT)
Dept: OBSTETRICS AND GYNECOLOGY | Facility: CLINIC | Age: 33
End: 2023-04-17
Payer: COMMERCIAL

## 2023-04-17 DIAGNOSIS — Z30.9 ENCOUNTER FOR CONTRACEPTIVE MANAGEMENT, UNSPECIFIED TYPE: Primary | ICD-10-CM

## 2023-04-18 ENCOUNTER — PATIENT MESSAGE (OUTPATIENT)
Dept: PSYCHIATRY | Facility: CLINIC | Age: 33
End: 2023-04-18
Payer: COMMERCIAL

## 2023-05-31 ENCOUNTER — OFFICE VISIT (OUTPATIENT)
Dept: OBSTETRICS AND GYNECOLOGY | Facility: CLINIC | Age: 33
End: 2023-05-31
Payer: COMMERCIAL

## 2023-05-31 VITALS
WEIGHT: 143.06 LBS | SYSTOLIC BLOOD PRESSURE: 102 MMHG | DIASTOLIC BLOOD PRESSURE: 70 MMHG | HEIGHT: 63 IN | BODY MASS INDEX: 25.35 KG/M2

## 2023-05-31 DIAGNOSIS — Z30.430 ENCOUNTER FOR IUD INSERTION: ICD-10-CM

## 2023-05-31 DIAGNOSIS — Z01.812 PRE-PROCEDURE LAB EXAM: Primary | ICD-10-CM

## 2023-05-31 LAB
B-HCG UR QL: NEGATIVE
CTP QC/QA: YES

## 2023-05-31 PROCEDURE — 99999 PR PBB SHADOW E&M-EST. PATIENT-LVL III: ICD-10-PCS | Mod: PBBFAC,,, | Performed by: OBSTETRICS & GYNECOLOGY

## 2023-05-31 PROCEDURE — 87591 N.GONORRHOEAE DNA AMP PROB: CPT | Performed by: OBSTETRICS & GYNECOLOGY

## 2023-05-31 PROCEDURE — 99499 NO LOS: ICD-10-PCS | Mod: S$GLB,,, | Performed by: OBSTETRICS & GYNECOLOGY

## 2023-05-31 PROCEDURE — 99999 PR PBB SHADOW E&M-EST. PATIENT-LVL III: CPT | Mod: PBBFAC,,, | Performed by: OBSTETRICS & GYNECOLOGY

## 2023-05-31 PROCEDURE — 58300 INSERT INTRAUTERINE DEVICE: CPT | Mod: S$GLB,,, | Performed by: OBSTETRICS & GYNECOLOGY

## 2023-05-31 PROCEDURE — 81025 POCT URINE PREGNANCY: ICD-10-PCS | Mod: S$GLB,,, | Performed by: OBSTETRICS & GYNECOLOGY

## 2023-05-31 PROCEDURE — 58300 PR INSERT INTRAUTERINE DEVICE: ICD-10-PCS | Mod: S$GLB,,, | Performed by: OBSTETRICS & GYNECOLOGY

## 2023-05-31 PROCEDURE — 99499 UNLISTED E&M SERVICE: CPT | Mod: S$GLB,,, | Performed by: OBSTETRICS & GYNECOLOGY

## 2023-05-31 PROCEDURE — 81025 URINE PREGNANCY TEST: CPT | Mod: S$GLB,,, | Performed by: OBSTETRICS & GYNECOLOGY

## 2023-05-31 NOTE — PROGRESS NOTES
Intrauterine device Placement:  5/31/2023      PRE-IUD PLACEMENT COUNSELING:  All contraceptive options were reviewed and the patient chooses an IUD.  The patient's history was reviewed and there are no contraindications to an IUD. The procedure and minimal risks of pain, bleeding, perforation and infection at the insertion and spontaneous expulsion within the first two weeks was discussed. The benefits of amenorrhea and no systemic side effects were explained. All questions were answered and the patient agrees to proceed. Consent was signed (scanned into computer).    EXAM:  Uterine Position: antiverted    PROCEDURE:  TIME OUT PERFORMED.  The cervix visualized with a speculum.  A single tooth tenaculum was not placed on the anterior lip.  The uterus sounded to 7cm using sterile technique.  A Kyleena IUD (lot#[id number])  was loaded and placed high in uterine fundus without difficulty using sterile technique.  The string was cut to 2cm length from exo cervix.   All instruments were removed from the cervix and vagina and the procedure was tolerated well.     ASSESSMENT:  1. Contraception management / IUD insertion.V25.0.    POST IUD PLACEMENT COUNSELING:  Manage post IUD placement pain with NSAIDs, Tylenol or Rx per [employer].  IUD danger signs and how to check the strings.  Removal in 5 years for Mirena IUD and in 10 years for Copper IUD.    Counseling lasted approximately 15 minutes and all her questions were answered.    FOLLOW-UP: With me in four weeks.

## 2023-06-02 ENCOUNTER — PATIENT MESSAGE (OUTPATIENT)
Dept: OBSTETRICS AND GYNECOLOGY | Facility: CLINIC | Age: 33
End: 2023-06-02
Payer: COMMERCIAL

## 2023-06-02 LAB
C TRACH DNA SPEC QL NAA+PROBE: NOT DETECTED
N GONORRHOEA DNA SPEC QL NAA+PROBE: NOT DETECTED

## 2023-06-27 NOTE — PROGRESS NOTES
"OUTPATIENT PSYCHIATRY INITIAL VISIT    Encounter Date: 07/03/2023    ID: Kaci Handley, a 32 y.o. female, presenting for initial evaluation visit. Met with patient. Informed of confidentiality rights and limitations. Discussed provider role in the treatment team.    Reason for Encounter: Referral from Sulema Byrnes NP   Chief Complaint   Patient presents with    Depression    Anxiety     CC: "I was diagnosed with bipolar disorder but I disagree with that."    HISTORY OF PRESENT ILLNESS:   Pt. is a 32 y.o. female, with a past psychiatric hx of anxiety and depression presenting to the clinic for an initial evaluation and treatment. PMHx outlined below. Pt is currently taking duloxetine 30 mg daily, olanzapine 5 mg daily.     Patient reports history of depression and anxiety beginning at age 14.  She notes she was started on fluoxetine at that time which caused no moods whatsoever, dry personality." She reports no difficulty with anxiety or depression between age 14 and 2020.  In 2020 she moved in with her 's parents as her  is pursuing his master's degree in history currently.  Prior to getting  patient was living alone in her own residence with her cat.  When she and her  moved in with her in-laws she was forced to get rid of her cat because her mother-in-law has 2 dogs.  Patient reports she began to develop depressed mood around that time and was started on duloxetine by her PCP.  Patient states she returned to her PCP noting the duloxetine did not resolve her depressed mood so her PCP diagnosed her with bipolar disorder and started olanzapine.    Patient reports her mother-in-law is passive-aggressive narcissistic.  She frequently tries to make the patient feel guilty for having any leisure time at all.  The patient's mother does not act in the same way in front of the patient's  as she does around only the patient.  Patient states her  is not supportive.  " " His mom pays his cell phone bill and car insurance so he is not in a hurry to move out."  Patient states her depressed mood is situational and depended upon her mother-in-law's treatment of her.    PSYCHIATRIC REVIEW OF SYMPTOMS  Is patient experiencing or having changes in:  Mood: "pretty good" recently because things with mother-in-law have been good  Plays with fingers, rubs hands together a lot, twisting earrings,  tells her she twitches in the middle of the night  Anhedonia: no  Guilt: no  Sleep: some difficulty maintaining asleep  Energy: "I feel exhausted all the time"  takes naps on lunch breaks; sleeps 8-11 hours per night  Concentration: difficulty concentrating at home; can concentrate at work   Appetite: "I eat, I am trying to lose weight and I'm in the gym 5 days per week and I eat between 1,400-1,600 calories per day. My weight was 148, I lost 10 lbs and I'm stuck at a plateau."   Reports frequent cravings for high sugar foods  Psychomotor: no  SI: no    Anxiety: no  Worry: no  Panic attacks: no  Restlessness/'on edge': no  Irritability: no  Muscle tension: no  Avoidance: no  Agoraphobia: no  Social phobia: no  Recurrent nightmares: no  Hyper startle response: no   Dissociative episodes: no  Recurrent thoughts: no  Recurrent behaviors: no    Distractibility: no  Indiscretion: no  Grandiosity: no   Racing thoughts/Flight of ideas: no  Increased activity: no  Reduced need for sleep: no  Talkativeness/Pressured speech: no     A/V hallucinations: no  Delusions: no  Paranoia: no    PSYCHOTROPIC MEDICATION HISTORY (Highest Dose)  Prozac (emotional blunting)  Trintellix (ineffective)  Lamictal (rash)  Trazodone (effective but drowsiness in AM)    PAST PSYCHIATRIC HISTORY:  Psychiatric Care (current & past):  psychiatrist at age 14  Previous Psychiatric Diagnoses:  MDD, TAAT, bipolar disorder  Previous Psychiatric Hospitalizations: denies  Previous SI/HI:   denies  Previous Suicide Attempts or NSSI: " denies  History of Psychotherapy: therapy at age 14  History of Violence: denies    PAST MEDICAL HISTORY:   Past Medical History:   Diagnosis Date    Abdominal pain, RLQ (right lower quadrant) 03/04/2013    Abnormal Pap smear of vagina     Allergy     Anemia     History of 2019 novel coronavirus disease (COVID-19) 12/08/2020    Hyperglycemia     Pituitary adenoma     Thyroid disease     thyroid level fluctuate    Well female exam with routine gynecological exam 11/21/2012    Wish to become pregnant in the immediate future[if female of childbearing age]: no    NEUROLOGIC HISTORY:  Seizures:  once in 2014, has not had any since   Head trauma:  denies    PAST SURGICAL HISTORY:  Past Surgical History:   Procedure Laterality Date    COLONOSCOPY  06/30/2017    F [facility]    CYST REMOVAL  2013    left wrist-benign    ENDOSCOPIC NASAL SEPTOPLASTY Bilateral 12/28/2018    Procedure: SEPTOPLASTY, NOSE, ENDOSCOPIC;  Surgeon: Sam Robin MD;  Location: [facility];  Service: ENT;  Laterality: Bilateral;    ENDOSCOPIC NASAL SEPTOPLASTY  08/05/2020    FUNCTIONAL ENDOSCOPIC SINUS SURGERY (FESS) USING COMPUTER-ASSISTED NAVIGATION Bilateral 12/28/2018    Procedure: FESS, USING COMPUTER-ASSISTED NAVIGATION;  Surgeon: Sam Robin MD;  Location: [facility];  Service: ENT;  Laterality: Bilateral;    NASAL RECONSTRUCTION  08/05/2020    with graft    NASAL TURBINATE REDUCTION Bilateral 12/28/2018    Procedure: REDUCTION, NASAL TURBINATE;  Surgeon: Sam Robin MD;  Location: [facility];  Service: ENT;  Laterality: Bilateral;    NASAL TURBINATE REDUCTION  08/05/2020    POLYPECTOMY Left 2/28/2023    Procedure: POLYPECTOMY/ Nasal;  Surgeon: Eduardo Rivas MD;  Location: [facility];  Service: ENT;  Laterality: Left;    TONSILLECTOMY  1998    WISDOM TOOTH EXTRACTION  2009       Review of patient's allergies indicates:  No Known Allergies     FAMILY HISTORY:   Paternal: unknown, Pt only rarely speaks to father, he uses drugs  Maternal:  mother bipolar and heavy drug user    SOCIAL HISTORY:   Developmental/Childhood: born and raised in [place], moved after [place] to [facility], then [facility], lived with grandmother at age 19  Marital Status/Relationship Status:  3 years, has been and will graduate with master's degree in history in approximately 6 months  Children: denies  Resides/Housing Status: [place] in her 's parent's house with her , her 's parents, her 's sister, and her 's sister's daughter  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  Hobbies/Recreational Activities: gym to workout  Spirituality/Mormonism: Orthodoxy, practicing  Education level: HS graduate, MA certification   History: 8 months in [employer], was d/c with an injury  Legal History: denies  Access to firearms: yes, some are kept in a safe, Pt has one in her vehicle for self defense     SUBSTANCE USE HISTORY:  Caffeine: 1-2 cups coffee in AM  Tobacco: denies  Alcohol:  very rarely, maybe like once per year, hemangioma on liver  Other Substances: denies  Rehab: denies  Detoxes:  denies      CURRENT MEDICATIONS  Outpatient Encounter Medications as of 6/28/2023   Medication Sig Dispense Refill    clobetasoL (TEMOVATE) 0.05 % external solution Apply topically.      fexofenadine (ALLEGRA) 180 MG tablet Take 180 mg by mouth once daily.      LINZESS 72 mcg Cap capsule Take 72 mcg by mouth every morning.      montelukast (SINGULAIR) 10 mg tablet TAKE 1 TABLET BY MOUTH ONCE DAILY IN THE EVENING 90 tablet 2    ondansetron (ZOFRAN-ODT) 8 MG TbDL Take 1 tablet (8 mg total) by mouth 3 (three) times daily as needed (nauea or vomiting). 30 tablet 2    vitamin B complex (SUPER B COMPLEX-B-12 ORAL)       [DISCONTINUED] DULoxetine (CYMBALTA) 30 MG capsule Take 2 capsules by mouth once daily 180 capsule 2    [DISCONTINUED] OLANZapine (ZYPREXA) 5 MG tablet TAKE 1 TABLET BY MOUTH ONCE DAILY IN THE EVENING 90 tablet 2    DULoxetine  "(CYMBALTA) 60 MG capsule Take 1 capsule (60 mg total) by mouth once daily. 90 capsule 0    Lactobac no.41/Bifidobact no.7 (PROBIOTIC-10 ORAL)       levocetirizine (XYZAL) 5 MG tablet Take 1 tablet (5 mg total) by mouth every evening. 90 tablet 3    OLANZapine (ZYPREXA) 2.5 MG tablet Take 1 tablet (2.5 mg total) by mouth every evening for 7 days, THEN 0.5 tablets (1.25 mg total) every evening for 7 days. 11 tablet 0    prenatal no122/iron/folic acid (PRENATAL MULTI ORAL) Take by mouth.      rizatriptan (MAXALT) 10 MG tablet Take 1 tablet (10 mg total) by mouth as needed for Migraine (take at onset of migraine,). May repeat in 2 hrs x 2 30 tablet 2     No facility-administered encounter medications on file as of 6/28/2023.       LABORATORY DATA  Office Visit on 05/31/2023   Component Date Value Ref Range Status    POC Preg Test, Ur 05/31/2023 Negative  Negative Final     Acceptable 05/31/2023 Yes   Final    Chlamydia, Amplified DNA 05/31/2023 Not Detected  Not Detected Final    N gonorrhoeae, amplified DNA 05/31/2023 Not Detected  Not Detected Final       MEDICAL REVIEW OF SYSTEMS:  Pain: Denies any significant chronic or acute pain.  Constitutional: Denies fever or change in appetite.  Cardiovascular: Denies chest pain or exertional dyspnea.  Respiratory: Denies cough or orthopnea.   GI: Denies abdominal pain, N/V  Neurological: Denies tremor, seizure, or focal weakness.  Psychiatric: See HPI above.    EXAM:  Nutritional Screening: Considering the patient's height and weight, medications, medical history and preferences, should a referral be made to the dietitian? No    Vitals: most recent vital signs were reviewed:  /74   Pulse 71   Ht 5' 2" (1.575 m)   Wt 64.3 kg (141 lb 12.1 oz)   LMP 06/06/2023 (Approximate)   BMI 25.93 kg/m²     General: age appropriate, well nourished, casually dressed, neatly groomed  MSK: muscle strength/tone: no tremor or abnormal movements.   Gait/Station: no " "ataxia, steady    PSYCHIATRIC:  Speech: Normal rate, rhythm, volume. No latency, no pressured speech  Mood/Affect:  euthymic, congruent, and appropriate   Though Process: organized, logical, linear  Thought Content: no  suicidal or homicidal ideation, no A/V hallucinations, delusions, or paranoia  Insight: Intact; aware of illness  Judgement: behavior is adequate to circumstances  Orientation: A&O x 4  Memory: Intact for content of interview, 3/3 immediate, 3/3 after 3 mins. Able to recall recent and remote events.  Language: Grossly intact, no aphasias   Concentration: Spells "world" correctly forward & backwards  Knowledge/Intelligence: appropriate to age and level of education.     SUICIDE RISK ASSESSMENT:  Protective factors: age, gender, no prior attempts, no prior hospitalizations, no family h/o attempts, no ongoing substance abuse, no psychosis, , has children, denies SI/intent/plan, seeking treatment, access to treatment, future oriented  Risks:  Access to firearms, situational depression, does not have good primary support  Patient is a low immediate and long-term risk considering risk factors.       IMPRESSION:    Kaci Handley is a 32 y.o. female that appears to be a reliable informant and is committed to working towards the goals of the treatment plan. Patient has a history of anxiety and depression. Presents today with symptoms of  unspecified anxiety disorder, unspecified depression, see HPI.       DIAGNOSES:    ICD-10-CM ICD-9-CM   1. Anxiety disorder, unspecified type  F41.9 300.00   2. Depression, unspecified depression type  F32.A 311         STRENGTHS AND LIABILITIES: Strength: Patient accepts guidance/feedback, Strength: Patient is expressive/articulate., Strength: Patient is intelligent., Strength: Patient is motivated for change., Strength: Patient is physically healthy., Strength: Patient has reasonable judgment., Liability: Patient lacks coping skills.    TREATMENT " GOALS:      Depression: Identify coping skills to aid in management of presenting symptoms, identify a safety plan if depressive symptoms become unmanageable, a noted decrease in depressive symptoms, and an increased client rating of quality of life. Participate in psychotherapy as indicated. Medication adherence.    Anxiety: Identify coping skills including deep breathing, grounding, time set aside for worry, and other identified skills, decrease in presenting anxiety related symptoms, and increased client rating of quality of life. Participate in psychotherapy as indicted. Medication adherence.      PLAN:  Continue Cymbalta 60 mg daily for anxiety and depression  Decrease olanzapine to 2.5 mg q.h.s. x7 days then decrease to 2.5 mg every other night x7 days then discontinue olanzapine  Referral placed for individual psychotherapy      Return to Clinic: 1 month      HESHAM Keane, PMHNP-BC      60 minutes spent on this encounter, >50% time spent in counseling.     At this time there are no indications the patient represents an imminent danger to either themselves or others; will continue to manage treatment in the outpatient setting.    I discussed the patient's care with the patient including benefits, alternatives, possible adverse effects of the treatment plan; including the potential for metabolic complications, major organ dysfunction, black box warnings, and contraindications. The opportunity was given for questions/clarification, and after this discussion the above treatment plan was devised through shared decision making. The patient voiced their understanding of the diagnoses and treatments listed above and agreed to the treatment plan. Follow up plan was reviewed with the patient. The patient was advised to call to report any worsening of symptoms or problems with medication.    Supportive therapy and psychoeducation provided. I discussed the importance of regular exercise, maintenance of a healthy  "weight, balanced diet rich in fruits/vegetables and lean protein, and avoidance of unhealthy habits like smoking and excessive alcohol intake. Educated patient about activating patient portal to receive education material. Patient agreed and understands that I will be providing reading material to help understand treatment.     Patient has been given crisis information including Suicide and Crisis Lifeline (call or text: 573). Patient also given instructions to go to the nearest ER or call 911 if unable to remain safe or if the Pt develops thoughts of harming self or others.    Reviewed past records regarding symptoms and treatments that have brought the patient to today's visit.     [facility]: Reviewed today to detect potential controlled substance misuse, diversion, excessive prescribing, or multiple providers prescribing controlled substances. The patients report was deemed appropriate without new medications of concerns prescribed by other providers.    Documentation entered by me for this encounter may have been done in part using [employer] voice recognition transcription software. Garbled syntax, mangled pronouns, and other bizarre constructions may be attributed to that software system. Although I have made an effort to ensure accuracy, "sound like" errors may exist and should be interpreted in context.    "

## 2023-06-28 ENCOUNTER — OFFICE VISIT (OUTPATIENT)
Dept: PSYCHIATRY | Facility: CLINIC | Age: 33
End: 2023-06-28
Payer: COMMERCIAL

## 2023-06-28 VITALS
BODY MASS INDEX: 26.09 KG/M2 | WEIGHT: 141.75 LBS | HEIGHT: 62 IN | DIASTOLIC BLOOD PRESSURE: 74 MMHG | SYSTOLIC BLOOD PRESSURE: 105 MMHG | HEART RATE: 71 BPM

## 2023-06-28 DIAGNOSIS — F32.A DEPRESSION, UNSPECIFIED DEPRESSION TYPE: ICD-10-CM

## 2023-06-28 DIAGNOSIS — F41.9 ANXIETY DISORDER, UNSPECIFIED TYPE: Primary | ICD-10-CM

## 2023-06-28 PROCEDURE — 3008F BODY MASS INDEX DOCD: CPT | Mod: CPTII,S$GLB,,

## 2023-06-28 PROCEDURE — 1159F PR MEDICATION LIST DOCUMENTED IN MEDICAL RECORD: ICD-10-PCS | Mod: CPTII,S$GLB,,

## 2023-06-28 PROCEDURE — 90792 PR PSYCHIATRIC DIAGNOSTIC EVALUATION W/MEDICAL SERVICES: ICD-10-PCS | Mod: S$GLB,,,

## 2023-06-28 PROCEDURE — 3074F PR MOST RECENT SYSTOLIC BLOOD PRESSURE < 130 MM HG: ICD-10-PCS | Mod: CPTII,S$GLB,,

## 2023-06-28 PROCEDURE — 3078F DIAST BP <80 MM HG: CPT | Mod: CPTII,S$GLB,,

## 2023-06-28 PROCEDURE — 1159F MED LIST DOCD IN RCRD: CPT | Mod: CPTII,S$GLB,,

## 2023-06-28 PROCEDURE — 1160F RVW MEDS BY RX/DR IN RCRD: CPT | Mod: CPTII,S$GLB,,

## 2023-06-28 PROCEDURE — 90792 PSYCH DIAG EVAL W/MED SRVCS: CPT | Mod: S$GLB,,,

## 2023-06-28 PROCEDURE — 3078F PR MOST RECENT DIASTOLIC BLOOD PRESSURE < 80 MM HG: ICD-10-PCS | Mod: CPTII,S$GLB,,

## 2023-06-28 PROCEDURE — 3074F SYST BP LT 130 MM HG: CPT | Mod: CPTII,S$GLB,,

## 2023-06-28 PROCEDURE — 1160F PR REVIEW ALL MEDS BY PRESCRIBER/CLIN PHARMACIST DOCUMENTED: ICD-10-PCS | Mod: CPTII,S$GLB,,

## 2023-06-28 PROCEDURE — 3008F PR BODY MASS INDEX (BMI) DOCUMENTED: ICD-10-PCS | Mod: CPTII,S$GLB,,

## 2023-06-28 PROCEDURE — 99999 PR PBB SHADOW E&M-EST. PATIENT-LVL IV: CPT | Mod: PBBFAC,,,

## 2023-06-28 PROCEDURE — 99999 PR PBB SHADOW E&M-EST. PATIENT-LVL IV: ICD-10-PCS | Mod: PBBFAC,,,

## 2023-06-28 RX ORDER — DULOXETIN HYDROCHLORIDE 60 MG/1
60 CAPSULE, DELAYED RELEASE ORAL DAILY
Qty: 90 CAPSULE | Refills: 0 | Status: SHIPPED | OUTPATIENT
Start: 2023-06-28 | End: 2023-09-20 | Stop reason: SDUPTHER

## 2023-06-28 RX ORDER — OLANZAPINE 2.5 MG/1
TABLET ORAL
Qty: 11 TABLET | Refills: 0 | Status: SHIPPED | OUTPATIENT
Start: 2023-06-28 | End: 2023-07-19

## 2023-07-05 ENCOUNTER — OFFICE VISIT (OUTPATIENT)
Dept: OBSTETRICS AND GYNECOLOGY | Facility: CLINIC | Age: 33
End: 2023-07-05
Payer: COMMERCIAL

## 2023-07-05 VITALS
WEIGHT: 142.88 LBS | DIASTOLIC BLOOD PRESSURE: 68 MMHG | SYSTOLIC BLOOD PRESSURE: 92 MMHG | BODY MASS INDEX: 26.29 KG/M2 | HEIGHT: 62 IN

## 2023-07-05 DIAGNOSIS — Z30.431 ENCOUNTER FOR ROUTINE CHECKING OF INTRAUTERINE CONTRACEPTIVE DEVICE (IUD): Primary | ICD-10-CM

## 2023-07-05 PROCEDURE — 3008F PR BODY MASS INDEX (BMI) DOCUMENTED: ICD-10-PCS | Mod: CPTII,S$GLB,, | Performed by: OBSTETRICS & GYNECOLOGY

## 2023-07-05 PROCEDURE — 3074F PR MOST RECENT SYSTOLIC BLOOD PRESSURE < 130 MM HG: ICD-10-PCS | Mod: CPTII,S$GLB,, | Performed by: OBSTETRICS & GYNECOLOGY

## 2023-07-05 PROCEDURE — 3008F BODY MASS INDEX DOCD: CPT | Mod: CPTII,S$GLB,, | Performed by: OBSTETRICS & GYNECOLOGY

## 2023-07-05 PROCEDURE — 99999 PR PBB SHADOW E&M-EST. PATIENT-LVL III: CPT | Mod: PBBFAC,,, | Performed by: OBSTETRICS & GYNECOLOGY

## 2023-07-05 PROCEDURE — 1159F PR MEDICATION LIST DOCUMENTED IN MEDICAL RECORD: ICD-10-PCS | Mod: CPTII,S$GLB,, | Performed by: OBSTETRICS & GYNECOLOGY

## 2023-07-05 PROCEDURE — 99213 PR OFFICE/OUTPT VISIT, EST, LEVL III, 20-29 MIN: ICD-10-PCS | Mod: S$GLB,,, | Performed by: OBSTETRICS & GYNECOLOGY

## 2023-07-05 PROCEDURE — 3074F SYST BP LT 130 MM HG: CPT | Mod: CPTII,S$GLB,, | Performed by: OBSTETRICS & GYNECOLOGY

## 2023-07-05 PROCEDURE — 99999 PR PBB SHADOW E&M-EST. PATIENT-LVL III: ICD-10-PCS | Mod: PBBFAC,,, | Performed by: OBSTETRICS & GYNECOLOGY

## 2023-07-05 PROCEDURE — 3078F DIAST BP <80 MM HG: CPT | Mod: CPTII,S$GLB,, | Performed by: OBSTETRICS & GYNECOLOGY

## 2023-07-05 PROCEDURE — 1159F MED LIST DOCD IN RCRD: CPT | Mod: CPTII,S$GLB,, | Performed by: OBSTETRICS & GYNECOLOGY

## 2023-07-05 PROCEDURE — 3078F PR MOST RECENT DIASTOLIC BLOOD PRESSURE < 80 MM HG: ICD-10-PCS | Mod: CPTII,S$GLB,, | Performed by: OBSTETRICS & GYNECOLOGY

## 2023-07-05 PROCEDURE — 99213 OFFICE O/P EST LOW 20 MIN: CPT | Mod: S$GLB,,, | Performed by: OBSTETRICS & GYNECOLOGY

## 2023-07-05 NOTE — PROGRESS NOTES
"  History of Present Illness:   Pateint presents today 1 month status post Intrauterine Device  Insertion without complaint. .    Pathology: none    Physical exam:  BP 92/68   Ht 5' 2" (1.575 m)   Wt 64.8 kg (142 lb 13.7 oz)   LMP 06/06/2023 (Approximate)   BMI 26.13 kg/m²   Constitutional: She is oriented to person, place, and time. She appears well-developed and well-nourished. No distress.   HENT:   Head: Normocephalic and atraumatic.   Eyes: Conjunctivae and EOM are normal. No scleral icterus.   Neck: Normal range of motion. Neck supple. No tracheal deviation present.   Cardiovascular: Normal rate.    Pulmonary/Chest: Effort normal. No respiratory distress. She exhibits no tenderness.  Breasts: deferred  Abdominal: Soft. She exhibits no distension and no mass. There is no rebound and no guarding.   Genitourinary:     External rectal exam shows no thrombosed external hemorrhoids.    Pelvic exam was performed with patient supine.   There is no rash, lesion or injury on the right labia.   There is no rash, lesion or injury on the left labia.   No bleeding and no signs of injury around the vaginal introitus, urethra is without lesions and well supported. The cervix is visualized with no discharge, lesions or friability. Intrauterine Device string easily visualized.    No vaginal discharge found.    No significant Cystocele, Enterocele or rectocele, and uterus well supported.   Bimanual exam:   The urethra is normal to palpation and there are no palpable vaginal wall masses.   Uterus is not deviated, not enlarged, not fixed, normal shape and not tender.   Cervix exhibits no motion tenderness.    Right adnexum displays no mass and no tenderness.   Left adnexum displays no mass and no tenderness.  Neurological: She is alert and oriented to person, place, and time. Coordination normal.   Skin: Skin is warm and dry. She is not diaphoretic.   Psychiatric: She has a normal mood and affect.    Assessment:  Doing well " with IUD.    Plan:  Resume normal activity and follow up as scheduled for routine screening.

## 2023-07-19 ENCOUNTER — OFFICE VISIT (OUTPATIENT)
Dept: PSYCHIATRY | Facility: CLINIC | Age: 33
End: 2023-07-19
Payer: COMMERCIAL

## 2023-07-19 VITALS
HEART RATE: 78 BPM | BODY MASS INDEX: 25.72 KG/M2 | WEIGHT: 139.75 LBS | SYSTOLIC BLOOD PRESSURE: 120 MMHG | DIASTOLIC BLOOD PRESSURE: 81 MMHG | HEIGHT: 62 IN

## 2023-07-19 DIAGNOSIS — F41.9 ANXIETY DISORDER, UNSPECIFIED TYPE: Primary | ICD-10-CM

## 2023-07-19 DIAGNOSIS — F32.A DEPRESSION, UNSPECIFIED DEPRESSION TYPE: ICD-10-CM

## 2023-07-19 PROCEDURE — 1160F PR REVIEW ALL MEDS BY PRESCRIBER/CLIN PHARMACIST DOCUMENTED: ICD-10-PCS | Mod: CPTII,S$GLB,,

## 2023-07-19 PROCEDURE — 1159F MED LIST DOCD IN RCRD: CPT | Mod: CPTII,S$GLB,,

## 2023-07-19 PROCEDURE — 3079F DIAST BP 80-89 MM HG: CPT | Mod: CPTII,S$GLB,,

## 2023-07-19 PROCEDURE — 99214 OFFICE O/P EST MOD 30 MIN: CPT | Mod: S$GLB,,,

## 2023-07-19 PROCEDURE — 3008F PR BODY MASS INDEX (BMI) DOCUMENTED: ICD-10-PCS | Mod: CPTII,S$GLB,,

## 2023-07-19 PROCEDURE — 99999 PR PBB SHADOW E&M-EST. PATIENT-LVL III: CPT | Mod: PBBFAC,,,

## 2023-07-19 PROCEDURE — 99999 PR PBB SHADOW E&M-EST. PATIENT-LVL III: ICD-10-PCS | Mod: PBBFAC,,,

## 2023-07-19 PROCEDURE — 3008F BODY MASS INDEX DOCD: CPT | Mod: CPTII,S$GLB,,

## 2023-07-19 PROCEDURE — 3074F PR MOST RECENT SYSTOLIC BLOOD PRESSURE < 130 MM HG: ICD-10-PCS | Mod: CPTII,S$GLB,,

## 2023-07-19 PROCEDURE — 99214 PR OFFICE/OUTPT VISIT, EST, LEVL IV, 30-39 MIN: ICD-10-PCS | Mod: S$GLB,,,

## 2023-07-19 PROCEDURE — 3074F SYST BP LT 130 MM HG: CPT | Mod: CPTII,S$GLB,,

## 2023-07-19 PROCEDURE — 3079F PR MOST RECENT DIASTOLIC BLOOD PRESSURE 80-89 MM HG: ICD-10-PCS | Mod: CPTII,S$GLB,,

## 2023-07-19 PROCEDURE — 1159F PR MEDICATION LIST DOCUMENTED IN MEDICAL RECORD: ICD-10-PCS | Mod: CPTII,S$GLB,,

## 2023-07-19 PROCEDURE — 1160F RVW MEDS BY RX/DR IN RCRD: CPT | Mod: CPTII,S$GLB,,

## 2023-07-19 NOTE — PROGRESS NOTES
"OUTPATIENT PSYCHIATRY FOLLOW UP VISIT    Encounter Date: 7/19/2023    Clinical Status of Patient:  Outpatient (Ambulatory)    Chief Complaint:  Kaci Handley is a 32 y.o. female who presents today for follow-up.  Met with patient.      HISTORY OF PRESENTING ILLNESS:  Kaci Handley is a 32 y.o. female with history of unspecified depressive disorder and unspecified anxiety disorder who presents for follow up appointment.      INITIAL HPI:  Pt. is a 32 y.o. female, with a past psychiatric hx of anxiety and depression presenting to the clinic for an initial evaluation and treatment. PMHx outlined below. Pt is currently taking duloxetine 30 mg daily, olanzapine 5 mg daily.      Patient reports history of depression and anxiety beginning at age 14.  She notes she was started on fluoxetine at that time which caused no moods whatsoever, dry personality." She reports no difficulty with anxiety or depression between age 14 and 2020.  In 2020 she moved in with her 's parents as her  is pursuing his master's degree in history currently.  Prior to getting  patient was living alone in her own residence with her cat.  When she and her  moved in with her in-laws she was forced to get rid of her cat because her mother-in-law has 2 dogs.  Patient reports she began to develop depressed mood around that time and was started on duloxetine by her PCP.  Patient states she returned to her PCP noting the duloxetine did not resolve her depressed mood so her PCP diagnosed her with bipolar disorder and started olanzapine.     Patient reports her mother-in-law is passive-aggressive narcissistic.  She frequently tries to make the patient feel guilty for having any leisure time at all.  The patient's mother does not act in the same way in front of the patient's  as she does around only the patient.  Patient states her  is not supportive.   His mom pays his cell phone bill " "and car insurance so he is not in a hurry to move out."  Patient states her depressed mood is situational and depended upon her mother-in-law's treatment of her.      Plan at last appointment on 6/28/2023:  Continue Cymbalta 60 mg daily for anxiety and depression  Decrease olanzapine to 2.5 mg q.h.s. x7 days then decrease to 2.5 mg every other night x7 days then discontinue olanzapine  Referral placed for individual psychotherapy    Psychotropic medication history:   Prozac (emotional blunting)  Trintellix (ineffective)  Lamictal (rash)  Trazodone (effective but drowsiness in AM)  Olanzapine 5 mg daily (sedation, fatigue, wt gain)      INTERVAL HISTORY:    Pt denies difficulty tapering olanzapine to discontinuation. She did experience one day of sadness and irritability with 3 episodes of crying after discontinuation of olanzapine which has since resolved. Denies further sad mood, irritability, or crying spells.     Pt states she is "Feeling much better! I feel really good." She reports improvement in her ability to wake up in the morning and is no longer experiencing drowsiness. She has more energy during the day and is no longer feeling tired during the day, "I don't feel like I need to take a nap all day anymore."    Appetite is decreased and Pt has lost 3 lbs over the last week since d/c olanzapine.    Pt continues to report fidgeting (e.g. rubbing her hands together, twisting earrings, "twitching" during the night).  AIMS today =1    Pt reports recent improvement in relationship with her mother-in-law. She has been supportive of the Pt and her mental health.     No interval episodes with symptoms consistent with je or hypomania.  Denied interval or current suicidal/homicidal thoughts, intent, or plan or NSSI.  Denied other questions and concerns.  Patient reports feeling stable and wishing to continue current management unchanged.    Medication side effects: see above  Medication adherence: " "yes    PSYCHIATRIC REVIEW OF SYSTEMS:  Is patient experiencing or having changes in:  Mood: "good"   Anhedonia: no  Guilt: no  Sleep: some difficulty maintaining sleep; waking up 2-3 times to use restroom, able to return to sleep easily  Energy: improved   Concentration: improved  Appetite: decreased after d/c olanzapine; previously "I eat, I am trying to lose weight and I'm in the gym 5 days per week and I eat between 1,400-1,600 calories per day. My weight was 148, I lost 10 lbs and I'm stuck at a plateau."   Cravings for high sugar foods have resolved  Psychomotor: no  SI: no     Anxiety: no  Worry: no  Panic attacks: no  Restlessness/'on edge': no  Irritability: no  Muscle tension: no  Avoidance: no  Agoraphobia: no  Social phobia: no  Recurrent nightmares: no  Hyper startle response: no   Dissociative episodes: no  Recurrent thoughts: no  Recurrent behaviors: no     Distractibility: no  Indiscretion: no  Grandiosity: no   Racing thoughts/Flight of ideas: no  Increased activity: no  Reduced need for sleep: no  Talkativeness/Pressured speech: no      A/V hallucinations: no  Delusions: no  Paranoia: no    MEDICAL REVIEW OF SYSTEMS:   Pain: Denies any significant chronic or acute pain.  Constitutional: Denies fever or change in appetite.  Cardiovascular: Denies chest pain or exertional dyspnea.  Respiratory: Denies cough or orthopnea.   GI: Denies abdominal pain, N/V  Neurological: Denies tremor, seizure, or focal weakness.  Psychiatric: See HPI above.    PAST PSYCHIATRIC, MEDICAL, AND SOCIAL HISTORY REVIEWED  The patient's past medical, family and social history have been reviewed and updated as appropriate within the electronic medical record - see encounter notes.    PAST MEDICAL HISTORY:   Past Medical History:   Diagnosis Date    Abdominal pain, RLQ (right lower quadrant) 03/04/2013    Abnormal Pap smear of vagina     Allergy     Anemia     History of 2019 novel coronavirus disease (COVID-19) 12/08/2020    " Hyperglycemia     Pituitary adenoma     Thyroid disease     thyroid level fluctuate    Well female exam with routine gynecological exam 11/21/2012    Wish to become pregnant in the immediate future[if female of childbearing age]: no     NEUROLOGIC HISTORY:  Seizures:  once in 2014, has not had any since   Head trauma:  denies    PAST PSYCHIATRIC HISTORY:  Psychiatric Care (current & past):  psychiatrist at age 14  Previous Psychiatric Diagnoses:  MDD, TATA, bipolar disorder  Previous Psychiatric Hospitalizations: denies  Previous SI/HI:   denies  Previous Suicide Attempts or NSSI: denies  History of Psychotherapy: therapy at age 14  History of Violence: denies    FAMILY HISTORY:   Paternal: unknown, Pt only rarely speaks to father, he uses drugs  Maternal: mother bipolar and heavy drug user    SOCIAL HISTORY:   Developmental/Childhood: born and raised in [place], moved after [place] to [facility], then [facility], lived with grandmother at age 19  Marital Status/Relationship Status:  3 years, has been and will graduate with master's degree in history in approximately 6 months  Children: denies  Resides/Housing Status: [place] in her 's parent's house with her , her 's parents, her 's sister, and her 's sister's daughter  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  Hobbies/Recreational Activities: gym to workout  Spirituality/Buddhism: Confucianism, practicing  Education level: HS graduate, MA certification   History: 8 months in [employer], was d/c with an injury  Legal History: denies  Access to firearms: yes, some are kept in a safe, Pt has one in her vehicle for self defense     SUBSTANCE USE HISTORY:  Caffeine: 1-2 cups coffee in AM  Tobacco: denies  Alcohol:  very rarely, maybe like once per year, hemangioma on liver  Other Substances: denies  Rehab: denies  Detoxes:  denies    MEDICATIONS:    Current Outpatient Medications:     chlorzoxazone (PARAFON  FORTE) 500 mg Tab, Take 1 tablet (500 mg total) by mouth 4 (four) times daily as needed (for back pain)., Disp: 30 tablet, Rfl: 1    clobetasoL (TEMOVATE) 0.05 % external solution, Apply topically., Disp: , Rfl:     DULoxetine (CYMBALTA) 60 MG capsule, Take 1 capsule (60 mg total) by mouth once daily., Disp: 90 capsule, Rfl: 0    fexofenadine (ALLEGRA) 180 MG tablet, Take 180 mg by mouth once daily., Disp: , Rfl:     ibuprofen (ADVIL,MOTRIN) 800 MG tablet, One tab 3 times a day as needed for back pain, Disp: 50 tablet, Rfl: 1    LINZESS 72 mcg Cap capsule, Take 72 mcg by mouth every morning., Disp: , Rfl:     montelukast (SINGULAIR) 10 mg tablet, TAKE 1 TABLET BY MOUTH ONCE DAILY IN THE EVENING, Disp: 90 tablet, Rfl: 2    ondansetron (ZOFRAN-ODT) 8 MG TbDL, Take 1 tablet (8 mg total) by mouth 3 (three) times daily as needed (nauea or vomiting)., Disp: 30 tablet, Rfl: 2    vitamin B complex (SUPER B COMPLEX-B-12 ORAL), , Disp: , Rfl:     methylPREDNISolone (MEDROL DOSEPACK) 4 mg tablet, use as directed (Patient not taking: Reported on 7/19/2023), Disp: 21 each, Rfl: 0    rizatriptan (MAXALT) 10 MG tablet, Take 1 tablet (10 mg total) by mouth as needed for Migraine (take at onset of migraine,). May repeat in 2 hrs x 2, Disp: 30 tablet, Rfl: 2    sulfamethoxazole-trimethoprim 800-160mg (BACTRIM DS) 800-160 mg Tab, Take 1 tablet by mouth 2 (two) times daily., Disp: , Rfl:     ALLERGIES:  Review of patient's allergies indicates:  No Known Allergies    EXAM:  Constitutional  Vitals:  Most recent vital signs were reviewed.   Last 3 sets of VS:  Vitals - 1 value per visit 7/11/2023 7/19/2023 7/19/2023   SYSTOLIC 92 - 120   DIASTOLIC 60 - 81   Pulse 88 - 78   Temp 98.4 - -   Resp 20 - -   SPO2 99 - -   Weight (lb) 143.3 - 139.77   Weight (kg) 65 - 63.4   Height 62 - 62   BMI (Calculated) 26.2 - 25.6   VISIT REPORT - - -   Pain Score  - 0 -   Some recent data might be hidden      General:  unremarkable, age appropriate      Musculoskeletal  Muscle Strength/Tone:  No tremor or abnormal movements   Gait & Station:  Steady, non-ataxic     Psychiatric  Speech:  no latency; no press   Mood & Affect:  euthymic  congruent and appropriate   Thought Process:  normal and logical   Associations:  intact   Thought Content:  normal, no suicidality, no homicidality, delusions, or paranoia   Insight:  intact   Judgement: behavior is adequate to circumstances   Orientation:  grossly intact   Memory: intact for content of interview   Language: grossly intact   Attention Span & Concentration:  able to focus   Fund of Knowledge:  intact and appropriate to age and level of education     SUICIDE RISK ASSESSMENT:  Protective factors: age, gender, no prior attempts, no prior hospitalizations, no family h/o attempts, no ongoing substance abuse, no psychosis, , has children, denies SI/intent/plan, seeking treatment, access to treatment, future oriented  Risks:  Access to firearms, situational depression, does not have good primary support  Patient is a low immediate and long-term risk considering risk factors.        RELEVANT LABS/STUDIES:    Lab Results   Component Value Date    WBC 6.2 11/26/2022    HGB 12.6 11/26/2022    HCT 38.5 11/26/2022    MCV 85.9 11/26/2022     11/26/2022     BMP  Lab Results   Component Value Date     11/26/2022    K 4.3 11/26/2022     11/26/2022    CO2 26 11/26/2022    BUN 15 11/26/2022    CREATININE 0.61 11/26/2022    CALCIUM 8.9 11/26/2022    ANIONGAP 7 (L) 06/18/2022    ESTGFRAFRICA >60 06/18/2022    EGFRNONAA >60 06/18/2022     Lab Results   Component Value Date    ALT 8 11/26/2022    AST 10 11/26/2022    ALKPHOS 77 12/23/2020    BILITOT 0.3 11/26/2022     Lab Results   Component Value Date    TSH 1.77 11/26/2022     Lab Results   Component Value Date    HGBA1C 4.9 03/07/2016     Lab Results   Component Value Date    CHOL 168 11/26/2022    TRIG 43 11/26/2022    HDL 52 11/26/2022    LDLCALC 103 (H)  11/26/2022    CHOLHDL 3.2 11/26/2022    TOTALCHOLEST 3.1 12/23/2020       IMPRESSION:    Kaci Handley is a 32 y.o. female with history of unspecified depressive disorder and unspecified anxiety disorder who presents for follow up appointment.    Status/Progress: Based on the examination today, the patient's problem(s) is/are improved and well controlled.  New problems have not been presented today.   Co-morbidities are not complicating management of the primary condition.  There are no active rule-out diagnoses for this patient at this time.     Risk Parameters:  Patient reports no suicidal ideation  Patient reports no homicidal ideation  Patient reports no self-injurious behavior  Patient reports no violent behavior    DIAGNOSES:    ICD-10-CM ICD-9-CM   1. Anxiety disorder, unspecified type  F41.9 300.00   2. Depression, unspecified depression type  F32.A 311       PLAN:  Continue Cymbalta 60 mg daily for anxiety and depression  Referral previously placed for individual psychotherapy, Pt is on wait list    RETURN TO CLINIC:   2 months      HESHAM Keane, PMHNP-BC    30 minutes of total time spent on the encounter, which includes face to face time and non-face to face time preparing to see the patient (eg. review of tests), obtaining and/or reviewing separately obtained history, documenting clinical information in the electronic health record, independently interpreting results (not separately reported), and communicating results to the patient/family/caregiver, or care coordination (not separately reported).     At this time there are no indications the patient represents an imminent danger to either themselves or others; will continue to manage treatment in the outpatient setting.    I discussed the patient's care with the patient including benefits, alternatives, possible adverse effects of the treatment plan; including the potential for metabolic complications, major organ dysfunction,  "black box warnings, and contraindications. The opportunity was given for questions/clarification, and after this discussion the above treatment plan was devised through shared decision making. The patient voiced their understanding of the diagnoses and treatments listed above and agreed to the treatment plan. Follow up plan was reviewed with the patient. The patient was advised to call to report any worsening of symptoms or problems with medication.    Supportive therapy and psychoeducation provided. Patient has been given crisis information including Suicide and Crisis Lifeline (call or text: 018). Patient also given instructions to go to the nearest ER or call 911 if unable to remain safe or if the Pt develops thoughts of harming self or others.    Documentation entered by me for this encounter may have been done in part using [employer] voice recognition transcription software. Garbled syntax, mangled pronouns, and other bizarre constructions may be attributed to that software system. Although I have made an effort to ensure accuracy, "sound like" errors may exist and should be interpreted in context.    "

## 2023-08-10 ENCOUNTER — OFFICE VISIT (OUTPATIENT)
Dept: PSYCHIATRY | Facility: CLINIC | Age: 33
End: 2023-08-10
Payer: COMMERCIAL

## 2023-08-10 DIAGNOSIS — F41.9 ANXIETY DISORDER, UNSPECIFIED TYPE: Primary | ICD-10-CM

## 2023-08-10 DIAGNOSIS — F32.A DEPRESSION, UNSPECIFIED DEPRESSION TYPE: ICD-10-CM

## 2023-08-10 PROCEDURE — 90791 PSYCH DIAGNOSTIC EVALUATION: CPT | Mod: S$GLB,,, | Performed by: SOCIAL WORKER

## 2023-08-10 PROCEDURE — 1159F MED LIST DOCD IN RCRD: CPT | Mod: CPTII,S$GLB,, | Performed by: SOCIAL WORKER

## 2023-08-10 PROCEDURE — 1159F PR MEDICATION LIST DOCUMENTED IN MEDICAL RECORD: ICD-10-PCS | Mod: CPTII,S$GLB,, | Performed by: SOCIAL WORKER

## 2023-08-10 PROCEDURE — 90791 PR PSYCHIATRIC DIAGNOSTIC EVALUATION: ICD-10-PCS | Mod: S$GLB,,, | Performed by: SOCIAL WORKER
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
## 2023-08-29 ENCOUNTER — PATIENT MESSAGE (OUTPATIENT)
Dept: PSYCHIATRY | Facility: CLINIC | Age: 33
End: 2023-08-29
Payer: COMMERCIAL

## 2023-08-30 ENCOUNTER — OFFICE VISIT (OUTPATIENT)
Dept: PSYCHIATRY | Facility: CLINIC | Age: 33
End: 2023-08-30
Payer: COMMERCIAL

## 2023-08-30 DIAGNOSIS — F41.9 ANXIETY DISORDER, UNSPECIFIED TYPE: Primary | ICD-10-CM

## 2023-08-30 DIAGNOSIS — F32.A DEPRESSION, UNSPECIFIED DEPRESSION TYPE: ICD-10-CM

## 2023-08-30 PROCEDURE — 1159F PR MEDICATION LIST DOCUMENTED IN MEDICAL RECORD: ICD-10-PCS | Mod: CPTII,S$GLB,, | Performed by: SOCIAL WORKER

## 2023-08-30 PROCEDURE — 1159F MED LIST DOCD IN RCRD: CPT | Mod: CPTII,S$GLB,, | Performed by: SOCIAL WORKER

## 2023-08-30 PROCEDURE — 90834 PR PSYCHOTHERAPY W/PATIENT, 45 MIN: ICD-10-PCS | Mod: S$GLB,,, | Performed by: SOCIAL WORKER

## 2023-08-30 PROCEDURE — 90834 PSYTX W PT 45 MINUTES: CPT | Mod: S$GLB,,, | Performed by: SOCIAL WORKER
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
## 2023-09-06 ENCOUNTER — PATIENT MESSAGE (OUTPATIENT)
Dept: OBSTETRICS AND GYNECOLOGY | Facility: CLINIC | Age: 33
End: 2023-09-06
Payer: COMMERCIAL

## 2023-09-18 ENCOUNTER — PATIENT MESSAGE (OUTPATIENT)
Dept: PSYCHIATRY | Facility: CLINIC | Age: 33
End: 2023-09-18
Payer: COMMERCIAL

## 2023-09-18 ENCOUNTER — TELEPHONE (OUTPATIENT)
Dept: PSYCHIATRY | Facility: CLINIC | Age: 33
End: 2023-09-18
Payer: COMMERCIAL

## 2023-09-20 ENCOUNTER — OFFICE VISIT (OUTPATIENT)
Dept: PSYCHIATRY | Facility: CLINIC | Age: 33
End: 2023-09-20
Payer: COMMERCIAL

## 2023-09-20 VITALS
BODY MASS INDEX: 23.3 KG/M2 | DIASTOLIC BLOOD PRESSURE: 69 MMHG | SYSTOLIC BLOOD PRESSURE: 100 MMHG | WEIGHT: 131.5 LBS | HEIGHT: 63 IN | HEART RATE: 79 BPM

## 2023-09-20 DIAGNOSIS — F41.9 ANXIETY DISORDER, UNSPECIFIED TYPE: ICD-10-CM

## 2023-09-20 DIAGNOSIS — F33.42 RECURRENT MAJOR DEPRESSIVE DISORDER, IN FULL REMISSION: Primary | ICD-10-CM

## 2023-09-20 PROCEDURE — 3008F PR BODY MASS INDEX (BMI) DOCUMENTED: ICD-10-PCS | Mod: CPTII,S$GLB,,

## 2023-09-20 PROCEDURE — 3078F DIAST BP <80 MM HG: CPT | Mod: CPTII,S$GLB,,

## 2023-09-20 PROCEDURE — 3074F PR MOST RECENT SYSTOLIC BLOOD PRESSURE < 130 MM HG: ICD-10-PCS | Mod: CPTII,S$GLB,,

## 2023-09-20 PROCEDURE — 99214 OFFICE O/P EST MOD 30 MIN: CPT | Mod: S$GLB,,,

## 2023-09-20 PROCEDURE — 99999 PR PBB SHADOW E&M-EST. PATIENT-LVL III: ICD-10-PCS | Mod: PBBFAC,,,

## 2023-09-20 PROCEDURE — 3008F BODY MASS INDEX DOCD: CPT | Mod: CPTII,S$GLB,,

## 2023-09-20 PROCEDURE — 1160F PR REVIEW ALL MEDS BY PRESCRIBER/CLIN PHARMACIST DOCUMENTED: ICD-10-PCS | Mod: CPTII,S$GLB,,

## 2023-09-20 PROCEDURE — 3074F SYST BP LT 130 MM HG: CPT | Mod: CPTII,S$GLB,,

## 2023-09-20 PROCEDURE — 1160F RVW MEDS BY RX/DR IN RCRD: CPT | Mod: CPTII,S$GLB,,

## 2023-09-20 PROCEDURE — 99214 PR OFFICE/OUTPT VISIT, EST, LEVL IV, 30-39 MIN: ICD-10-PCS | Mod: S$GLB,,,

## 2023-09-20 PROCEDURE — 99999 PR PBB SHADOW E&M-EST. PATIENT-LVL III: CPT | Mod: PBBFAC,,,

## 2023-09-20 PROCEDURE — 3078F PR MOST RECENT DIASTOLIC BLOOD PRESSURE < 80 MM HG: ICD-10-PCS | Mod: CPTII,S$GLB,,

## 2023-09-20 PROCEDURE — 1159F MED LIST DOCD IN RCRD: CPT | Mod: CPTII,S$GLB,,

## 2023-09-20 PROCEDURE — 1159F PR MEDICATION LIST DOCUMENTED IN MEDICAL RECORD: ICD-10-PCS | Mod: CPTII,S$GLB,,

## 2023-09-20 RX ORDER — DULOXETIN HYDROCHLORIDE 60 MG/1
60 CAPSULE, DELAYED RELEASE ORAL DAILY
Qty: 90 CAPSULE | Refills: 1 | Status: SHIPPED | OUTPATIENT
Start: 2023-09-20 | End: 2024-03-26 | Stop reason: SDUPTHER

## 2023-09-20 NOTE — PROGRESS NOTES
"OUTPATIENT PSYCHIATRY FOLLOW UP VISIT    Encounter Date: 9/20/2023    Clinical Status of Patient:  Outpatient (Ambulatory)    Chief Complaint:  Kaci Handley is a 32 y.o. female who presents today for follow-up.  Met with patient.      HISTORY OF PRESENTING ILLNESS:  Kaci Handley is a 32 y.o. female with history of unspecified depressive disorder and unspecified anxiety disorder who presents for follow up appointment.      INITIAL HPI:  Patient reports history of depression and anxiety beginning at age 14.  She notes she was started on fluoxetine at that time which caused no moods whatsoever, dry personality." She reports no difficulty with anxiety or depression between age 14 and 2020.  In 2020 she moved in with her 's parents as her  is pursuing his master's degree in history currently.  Prior to getting  patient was living alone in her own residence with her cat.  When she and her  moved in with her in-laws she was forced to get rid of her cat because her mother-in-law has 2 dogs.  Patient reports she began to develop depressed mood around that time and was started on duloxetine by her PCP.  Patient states she returned to her PCP noting the duloxetine did not resolve her depressed mood so her PCP diagnosed her with bipolar disorder and started olanzapine.     Patient reports her mother-in-law is passive-aggressive narcissistic.  She frequently tries to make the patient feel guilty for having any leisure time at all.  The patient's mother does not act in the same way in front of the patient's  as she does around only the patient.  Patient states her  is not supportive.   His mom pays his cell phone bill and car insurance so he is not in a hurry to move out."  Patient states her depressed mood is situational and depended upon her mother-in-law's treatment of her.    7/19/2023: Pt denies difficulty tapering olanzapine to discontinuation. She " "did experience one day of sadness and irritability with 3 episodes of crying after discontinuation of olanzapine which has since resolved. Denies further sad mood, irritability, or crying spells.   Pt states she is "Feeling much better! I feel really good." She reports improvement in her ability to wake up in the morning and is no longer experiencing drowsiness. She has more energy during the day and is no longer feeling tired during the day, "I don't feel like I need to take a nap all day anymore."  Appetite is decreased and Pt has lost 3 lbs over the last week since d/c olanzapine.  Pt continues to report fidgeting (e.g. rubbing her hands together, twisting earrings, "twitching" during the night).  AIMS today =1  Pt reports recent improvement in relationship with her mother-in-law. She has been supportive of the Pt and her mental health.       Plan at last appointment on 7/19/2023:   Continue Cymbalta 60 mg daily for anxiety and depression  Referral previously placed for individual psychotherapy, Pt is on wait list    Psychotropic medication history:   Prozac (emotional blunting)  Trintellix (ineffective)  Lamictal (rash)  Trazodone (effective but drowsiness in AM)  Olanzapine 5 mg daily (sedation, fatigue, wt gain)      INTERVAL HISTORY:    "I've been really good." Mood has been stable. Denies irritability, low mood, or crying spells.     Continues fidgeting, "But it might just be my anxiety, I've always done that. I tap my feet or fingers".     Getting along with mother-in-law. "It's up and down, she has her days, but were doing well right now."     Continues therapy with SORAYA Mancini LCSW    7/23 AIMS =1    No interval episodes with symptoms consistent with je or hypomania.  Denied interval or current suicidal/homicidal thoughts, intent, or plan or NSSI.  Denied other questions and concerns.  Patient reports feeling stable and wishing to continue current management unchanged.    Medication side effects: see " "above  Medication adherence: yes    PSYCHIATRIC REVIEW OF SYSTEMS:  Is patient experiencing or having changes in:  Mood: "good"   Anhedonia: no  Guilt: no  Sleep: no, sleeping well, no difficulty waking in AM  Energy: improved   Concentration: improved  Appetite: "normal" has lost 13 lbs since d/c olanzapine; Cravings for high sugar foods have resolved  Psychomotor: no  SI: no     Anxiety: mild  Worry: no  Panic attacks: no  Restlessness/'on edge': no  Irritability: no  Muscle tension: no  Avoidance: no  Agoraphobia: no  Social phobia: no  Recurrent nightmares: no  Hyper startle response: no   Dissociative episodes: no  Recurrent thoughts: no  Recurrent behaviors: no     Distractibility: no  Indiscretion: no  Grandiosity: no   Racing thoughts/Flight of ideas: no  Increased activity: no  Reduced need for sleep: no  Talkativeness/Pressured speech: no      A/V hallucinations: no  Delusions: no  Paranoia: no    MEDICAL REVIEW OF SYSTEMS:   Pain: Denies any significant chronic or acute pain.  Constitutional: Denies fever or change in appetite.  Cardiovascular: Denies chest pain or exertional dyspnea.  Respiratory: Denies cough or orthopnea.   GI: Denies abdominal pain, N/V  Neurological: Denies tremor, seizure, or focal weakness.  Psychiatric: See HPI above.    PAST PSYCHIATRIC, MEDICAL, AND SOCIAL HISTORY REVIEWED  The patient's past medical, family and social history have been reviewed and updated as appropriate within the electronic medical record - see encounter notes.    PAST MEDICAL HISTORY:   Past Medical History:   Diagnosis Date    Abdominal pain, RLQ (right lower quadrant) 03/04/2013    Abnormal Pap smear of vagina     Allergy     Anemia     History of 2019 novel coronavirus disease (COVID-19) 12/08/2020    Hyperglycemia     Pituitary adenoma     Thyroid disease     thyroid level fluctuate    Well female exam with routine gynecological exam 11/21/2012    Wish to become pregnant in the immediate future[if female " of childbearing age]: no     NEUROLOGIC HISTORY:  Seizures:  once in 2014, has not had any since   Head trauma:  denies    PAST PSYCHIATRIC HISTORY:  Psychiatric Care (current & past):  psychiatrist at age 14  Previous Psychiatric Diagnoses:  MDD, TATA, bipolar disorder  Previous Psychiatric Hospitalizations: denies  Previous SI/HI:   denies  Previous Suicide Attempts or NSSI: denies  History of Psychotherapy: therapy at age 14  History of Violence: denies    FAMILY HISTORY:   Paternal: unknown, Pt only rarely speaks to father, he uses drugs  Maternal: mother bipolar and heavy drug user    SOCIAL HISTORY:   Developmental/Childhood: born and raised in [place], moved after [place] to [facility], then [facility], lived with grandmother at age 19  Marital Status/Relationship Status:  3 years, has been and will graduate with master's degree in history in approximately 6 months  Children: denies  Resides/Housing Status: [place] in her 's parent's house with her , her 's parents, her 's sister, and her 's sister's daughter  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  Hobbies/Recreational Activities: gym to workout  Spirituality/Lutheran: Hoahaoism, practicing  Education level: HS graduate, MA certification   History: 8 months in [employer], was d/c with an injury  Legal History: denies  Access to firearms: yes, some are kept in a safe, Pt has one in her vehicle for self defense     SUBSTANCE USE HISTORY:  Caffeine: 1-2 cups coffee in AM  Tobacco: denies  Alcohol:  very rarely, maybe like once per year, hemangioma on liver  Other Substances: denies  Rehab: denies  Detoxes:  denies    MEDICATIONS:    Current Outpatient Medications:     chlorzoxazone (PARAFON FORTE) 500 mg Tab, Take 1 tablet (500 mg total) by mouth 4 (four) times daily as needed (for back pain)., Disp: 30 tablet, Rfl: 1    clobetasoL (TEMOVATE) 0.05 % external solution, Apply topically., Disp: ,  "Rfl:     DULoxetine (CYMBALTA) 60 MG capsule, Take 1 capsule (60 mg total) by mouth once daily., Disp: 90 capsule, Rfl: 1    fexofenadine (ALLEGRA) 180 MG tablet, Take 180 mg by mouth once daily., Disp: , Rfl:     ibuprofen (ADVIL,MOTRIN) 800 MG tablet, One tab 3 times a day as needed for back pain, Disp: 50 tablet, Rfl: 1    LINZESS 72 mcg Cap capsule, Take 72 mcg by mouth every morning., Disp: , Rfl:     montelukast (SINGULAIR) 10 mg tablet, TAKE 1 TABLET BY MOUTH ONCE DAILY IN THE EVENING, Disp: 90 tablet, Rfl: 2    ondansetron (ZOFRAN-ODT) 8 MG TbDL, Take 1 tablet (8 mg total) by mouth 3 (three) times daily as needed (nauea or vomiting)., Disp: 30 tablet, Rfl: 2    rizatriptan (MAXALT) 10 MG tablet, Take 1 tablet (10 mg total) by mouth as needed for Migraine (take at onset of migraine,). May repeat in 2 hrs x 2, Disp: 30 tablet, Rfl: 2    sulfamethoxazole-trimethoprim 800-160mg (BACTRIM DS) 800-160 mg Tab, Take 1 tablet by mouth 2 (two) times daily., Disp: , Rfl:     vitamin B complex (SUPER B COMPLEX-B-12 ORAL), , Disp: , Rfl:     ALLERGIES:  Review of patient's allergies indicates:  No Known Allergies    EXAM:  Constitutional  Vitals:  Most recent vital signs were reviewed.   Last 3 sets of VS:      7/11/2023     8:12 AM 7/19/2023    10:57 AM 9/20/2023     8:19 AM   Vitals - 1 value per visit   SYSTOLIC 92 120 100   DIASTOLIC 60 81 69   Pulse 88 78 79   Temp 98.4 °F (36.9 °C)     Resp 20     SPO2 99 %     Weight (lb) 143.3 139.77 131.5   Weight (kg) 65 63.4 59.65   Height 5' 2" (1.575 m) 5' 2" (1.575 m) 5' 3" (1.6 m)   BMI (Calculated) 26.2 25.6 23.3   Pain Score Four Zero Zero      General:  unremarkable, age appropriate     Musculoskeletal  Muscle Strength/Tone:  No tremor or abnormal movements   Gait & Station:  Steady, non-ataxic     Psychiatric  Speech:  no latency; no press   Mood & Affect:  euthymic  congruent and appropriate   Thought Process:  normal and logical   Associations:  intact   Thought " Content:  normal, no suicidality, no homicidality, delusions, or paranoia   Insight:  intact   Judgement: behavior is adequate to circumstances   Orientation:  grossly intact   Memory: intact for content of interview   Language: grossly intact   Attention Span & Concentration:  able to focus   Fund of Knowledge:  intact and appropriate to age and level of education     SUICIDE RISK ASSESSMENT:  Protective factors: age, gender, no prior attempts, no prior hospitalizations, no family h/o attempts, no ongoing substance abuse, no psychosis, , has children, denies SI/intent/plan, seeking treatment, access to treatment, future oriented  Risks:  Access to firearms, hx depression and anxiety  Patient is a low immediate and long-term risk considering risk factors.        RELEVANT LABS/STUDIES:    Lab Results   Component Value Date    WBC 6.2 11/26/2022    HGB 12.6 11/26/2022    HCT 38.5 11/26/2022    MCV 85.9 11/26/2022     11/26/2022     BMP  Lab Results   Component Value Date     11/26/2022    K 4.3 11/26/2022     11/26/2022    CO2 26 11/26/2022    BUN 15 11/26/2022    CREATININE 0.61 11/26/2022    CALCIUM 8.9 11/26/2022    ANIONGAP 7 (L) 06/18/2022    ESTGFRAFRICA >60 06/18/2022    EGFRNONAA >60 06/18/2022     Lab Results   Component Value Date    ALT 8 11/26/2022    AST 10 11/26/2022    ALKPHOS 77 12/23/2020    BILITOT 0.3 11/26/2022     Lab Results   Component Value Date    TSH 1.77 11/26/2022     Lab Results   Component Value Date    HGBA1C 4.9 03/07/2016     Lab Results   Component Value Date    CHOL 168 11/26/2022    TRIG 43 11/26/2022    HDL 52 11/26/2022    LDLCALC 103 (H) 11/26/2022    CHOLHDL 3.2 11/26/2022    TOTALCHOLEST 3.1 12/23/2020       IMPRESSION:    Kaci Handley is a 32 y.o. female with history of unspecified depressive disorder and unspecified anxiety disorder who presents for follow up appointment.    Status/Progress: Based on the examination today, the  patient's problem(s) is/are well controlled.  New problems have not been presented today.   Co-morbidities are not complicating management of the primary condition.  There are no active rule-out diagnoses for this patient at this time.     Risk Parameters:  Patient reports no suicidal ideation  Patient reports no homicidal ideation  Patient reports no self-injurious behavior  Patient reports no violent behavior    DIAGNOSES:    ICD-10-CM ICD-9-CM   1. Recurrent major depressive disorder, in full remission  F33.42 296.36   2. Anxiety disorder, unspecified type  F41.9 300.00       PLAN:  Continue Cymbalta 60 mg daily for anxiety and depression  Continue individual psychotherapy with SORAYA Mancini LCSW    RETURN TO CLINIC:   6 months or sooner p.r.n.       Niyah Augustine, HESHAM, PMHNP-BC    30 minutes of total time spent on the encounter, which includes face to face time and non-face to face time preparing to see the patient (eg. review of tests), obtaining and/or reviewing separately obtained history, documenting clinical information in the electronic health record, independently interpreting results (not separately reported), and communicating results to the patient/family/caregiver, or care coordination (not separately reported).     At this time there are no indications the patient represents an imminent danger to either themselves or others; will continue to manage treatment in the outpatient setting.    I discussed the patient's care with the patient including benefits, alternatives, possible adverse effects of the treatment plan; including the potential for metabolic complications, major organ dysfunction, black box warnings, and contraindications. The opportunity was given for questions/clarification, and after this discussion the above treatment plan was devised through shared decision making. The patient voiced their understanding of the diagnoses and treatments listed above and agreed to the treatment plan.  "Follow up plan was reviewed with the patient. The patient was advised to call to report any worsening of symptoms or problems with medication.    Supportive therapy and psychoeducation provided. Patient has been given crisis information including Suicide and Crisis Lifeline (call or text: 874). Patient also given instructions to go to the nearest ER or call 911 if unable to remain safe or if the Pt develops thoughts of harming self or others.    Documentation entered by me for this encounter may have been done in part using [employer] Direct voice recognition transcription software. Garbled syntax, mangled pronouns, and other bizarre constructions may be attributed to that software system. Although I have made an effort to ensure accuracy, "sound like" errors may exist and should be interpreted in context.    "

## 2023-09-25 ENCOUNTER — PATIENT MESSAGE (OUTPATIENT)
Dept: PSYCHIATRY | Facility: CLINIC | Age: 33
End: 2023-09-25
Payer: COMMERCIAL

## 2023-10-25 ENCOUNTER — OFFICE VISIT (OUTPATIENT)
Dept: PSYCHIATRY | Facility: CLINIC | Age: 33
End: 2023-10-25
Payer: COMMERCIAL

## 2023-10-25 DIAGNOSIS — F41.9 ANXIETY DISORDER, UNSPECIFIED TYPE: ICD-10-CM

## 2023-10-25 DIAGNOSIS — F33.42 RECURRENT MAJOR DEPRESSIVE DISORDER, IN FULL REMISSION: Primary | ICD-10-CM

## 2023-10-25 PROCEDURE — 1159F MED LIST DOCD IN RCRD: CPT | Mod: CPTII,S$GLB,, | Performed by: SOCIAL WORKER

## 2023-10-25 PROCEDURE — 1159F PR MEDICATION LIST DOCUMENTED IN MEDICAL RECORD: ICD-10-PCS | Mod: CPTII,S$GLB,, | Performed by: SOCIAL WORKER

## 2023-10-25 PROCEDURE — 90834 PR PSYCHOTHERAPY W/PATIENT, 45 MIN: ICD-10-PCS | Mod: S$GLB,,, | Performed by: SOCIAL WORKER

## 2023-10-25 PROCEDURE — 90834 PSYTX W PT 45 MINUTES: CPT | Mod: S$GLB,,, | Performed by: SOCIAL WORKER

## 2023-10-25 NOTE — PROGRESS NOTES
Individual Psychotherapy (PhD/LCSW)    10/25/2023    Site:  [facility]         Therapeutic Intervention: Met with patient.  Outpatient - Supportive psychotherapy 45 min - CPT Code 49491    Chief complaint/reason for encounter: depression, anxiety, and interpersonal     Interval history and content of current session: Patient was seen this date. She was last seen on 8/30/23. Patient reported she has been doing well in terms of her mood. She shared she has been exercising 5 days out of the week and using this as a tool to keep her mood balanced and this has helped both with her mood and physical health. Patient reported she has also lost 22 lbs. Patient reported she continues to have problems with her mother-in-law being critical of her, monitoring her clothing and behaviors. Patient shared she has not spoken with her mother-in-law about this as was the recommendation in our last session. Patient is fearful this will only lead to an argument and she does not want to cause problems since she and her  live with her in laws. Counselor encouraged Patient to address her feelings and set appropriate boundaries. While she agrees, Patient states she informs her , and he discusses with his mother. The behaviors will stop for awhile and return. Counselor offered suggestions, empathy and support.    Patient further reported her mother has been calling Patient to help her correct Patient's 11-year-old sister. Patient shared her sister is not cooperating with their mother and Patient's mother, calls Patient when she is working in an effort to have Patient convince her sister to go to school, for example. Counselor encouraged Patient to set boundaries with her mother as she cannot disciple her younger sister, especially when she is at work. Patient agreed and reported she has informed her mother of this. Patient's mood and affect were positive and up-beat this date. She shared she and her  are getting  along with each other well and work is going good. Overall, she is doing well.    Treatment plan:  Target symptoms: depression, anxiety   Why chosen therapy is appropriate versus another modality: patient responds to this modality  Outcome monitoring methods: self-report, observation  Therapeutic intervention type: supportive psychotherapy    Risk parameters:  Patient reports no suicidal ideation  Patient reports no homicidal ideation  Patient reports no self-injurious behavior  Patient reports no violent behavior    Verbal deficits: None    Patient's response to intervention:  The patient's response to intervention is accepting, motivated.    Progress toward goals and other mental status changes:  The patient's progress toward goals is good.    Diagnosis:     ICD-10-CM ICD-9-CM   1. Recurrent major depressive disorder, in full remission  F33.42 296.36   2. Anxiety disorder, unspecified type  F41.9 300.00       Plan:  individual psychotherapy and medication management by physician    Return to clinic: 1 month    Length of Service (minutes): 45

## 2023-11-24 ENCOUNTER — OFFICE VISIT (OUTPATIENT)
Dept: PSYCHIATRY | Facility: CLINIC | Age: 33
End: 2023-11-24
Payer: COMMERCIAL

## 2023-11-24 DIAGNOSIS — F41.9 ANXIETY DISORDER, UNSPECIFIED TYPE: ICD-10-CM

## 2023-11-24 DIAGNOSIS — F33.42 RECURRENT MAJOR DEPRESSIVE DISORDER, IN FULL REMISSION: Primary | ICD-10-CM

## 2023-11-24 PROCEDURE — 90837 PSYTX W PT 60 MINUTES: CPT | Mod: S$GLB,,, | Performed by: SOCIAL WORKER

## 2023-11-24 PROCEDURE — 1159F MED LIST DOCD IN RCRD: CPT | Mod: CPTII,S$GLB,, | Performed by: SOCIAL WORKER

## 2023-11-24 PROCEDURE — 1159F PR MEDICATION LIST DOCUMENTED IN MEDICAL RECORD: ICD-10-PCS | Mod: CPTII,S$GLB,, | Performed by: SOCIAL WORKER

## 2023-11-24 PROCEDURE — 90837 PR PSYCHOTHERAPY W/PATIENT, 60 MIN: ICD-10-PCS | Mod: S$GLB,,, | Performed by: SOCIAL WORKER

## 2023-11-24 NOTE — PROGRESS NOTES
Individual Psychotherapy (PhD/LCSW)    11/24/2023    Site:  [facility]         Therapeutic Intervention: Met with patient.  Outpatient - Supportive psychotherapy 60 min - CPT Code 44157    Chief complaint/reason for encounter: depression, anxiety, and interpersonal     Interval history and content of current session: Patient was seen this date. She was last seen on 10/25/23. Patient reports she has been doing well in terms of her health and her mood has been good for the most part. However, she indicated she has recently had situational anxiety brought on by interactions with her mother-in-law as she has continued to be critical of Patient, interfering in her relationship with her , by interjecting herself into their personal conversations. Patient shared her 3rd anniversary just past, she was disappointed with her , as he became aggravated with Patient when she asked him to go to lunch with her. Patient shared she got her  a gift and he did not give her anything. Patient shared she was hurt and when they were discussing this at home, his mother jumped into the discussion from another room taking Patient's husbands side. Patient indicated she is thinking of moving into an apartment. She shared she does not want to leave her marriage and she is considering moving out of her in-laws home with or without her . Patient shared she is overwhelmed by her mother-in-law. Counselor suggested she discuss her feelings and plans with her . Counselor offered support and encouraged Patient to continue using tools of re-direction, thought stopping, deep breathing and exercise to take care of her self and address symptoms of anxiety. Patient was reassured her thinking is not unreasonable.    Treatment plan:  Target symptoms: depression, anxiety   Why chosen therapy is appropriate versus another modality: patient responds to this modality  Outcome monitoring methods: self-report,  observation  Therapeutic intervention type: supportive psychotherapy    Risk parameters:  Patient reports no suicidal ideation  Patient reports no homicidal ideation  Patient reports no self-injurious behavior  Patient reports no violent behavior    Verbal deficits: None    Patient's response to intervention:  The patient's response to intervention is accepting.    Progress toward goals and other mental status changes:  The patient's progress toward goals is good.    Diagnosis:     ICD-10-CM ICD-9-CM   1. Recurrent major depressive disorder, in full remission  F33.42 296.36   2. Anxiety disorder, unspecified type  F41.9 300.00       Plan:  individual psychotherapy and medication management by physician    Return to clinic: 1 month    Length of Service (minutes): 60

## 2023-12-06 PROBLEM — F33.41 RECURRENT MAJOR DEPRESSIVE DISORDER, IN PARTIAL REMISSION: Chronic | Status: ACTIVE | Noted: 2022-11-24

## 2023-12-06 PROBLEM — G43.909 MIGRAINE: Status: ACTIVE | Noted: 2019-07-11

## 2023-12-06 PROBLEM — G43.909 MIGRAINE: Chronic | Status: ACTIVE | Noted: 2019-07-11

## 2023-12-21 ENCOUNTER — TELEPHONE (OUTPATIENT)
Dept: NEUROLOGY | Facility: CLINIC | Age: 33
End: 2023-12-21
Payer: COMMERCIAL

## 2023-12-26 ENCOUNTER — PATIENT MESSAGE (OUTPATIENT)
Dept: NEUROLOGY | Facility: CLINIC | Age: 33
End: 2023-12-26
Payer: COMMERCIAL

## 2023-12-26 ENCOUNTER — OFFICE VISIT (OUTPATIENT)
Dept: PSYCHIATRY | Facility: CLINIC | Age: 33
End: 2023-12-26
Payer: COMMERCIAL

## 2023-12-26 DIAGNOSIS — F33.42 RECURRENT MAJOR DEPRESSIVE DISORDER, IN FULL REMISSION: Primary | ICD-10-CM

## 2023-12-26 DIAGNOSIS — F41.9 ANXIETY DISORDER, UNSPECIFIED TYPE: ICD-10-CM

## 2023-12-26 PROCEDURE — 1159F PR MEDICATION LIST DOCUMENTED IN MEDICAL RECORD: ICD-10-PCS | Mod: CPTII,S$GLB,, | Performed by: SOCIAL WORKER

## 2023-12-26 PROCEDURE — 90837 PR PSYCHOTHERAPY W/PATIENT, 60 MIN: ICD-10-PCS | Mod: S$GLB,,, | Performed by: SOCIAL WORKER

## 2023-12-26 PROCEDURE — 1159F MED LIST DOCD IN RCRD: CPT | Mod: CPTII,S$GLB,, | Performed by: SOCIAL WORKER

## 2023-12-26 PROCEDURE — 90837 PSYTX W PT 60 MINUTES: CPT | Mod: S$GLB,,, | Performed by: SOCIAL WORKER

## 2023-12-26 NOTE — PROGRESS NOTES
"Individual Psychotherapy (PhD/LCSW)    12/26/2023    Site:  [facility]         Therapeutic Intervention: Met with patient.  Outpatient - Supportive psychotherapy 60 min - CPT Code 96989    Chief complaint/reason for encounter: depression, anxiety, and interpersonal     Interval history and content of current session: Patient was seen this date. She was last seen on 11/24/23. Patient reported her mood has been mostly good. She indicated some situational sadness and some anxiety related to holiday events. Patient indicated she has been able to address her symptoms as they arose and move on. Patient shared she missed her grandparents this year at her birthday and that caused her some sadness. She also reported her mother's gift made her aware, her mother is "stuck" in her thinking of Patient as she bought her some gifts that she would have enjoyed when she was between the ages of [age]. Patient asked for advice with regard to how to discuss this with her mother. Counselor reminded Patient to use open communication and I statements and inviting her mother to getting to know her today. Patient also shared about her work environment and an older co-worker who she finds frustrating. Counselor again offered the same advice, informing the co-worker, using assertive communication. Patient shared her father reached out on her birthday and on [place]. She shared she would like to have contact with him at other times as well; however, she is cautious due to his using history. Patient shared she does not know his current living status. Counselor offered support, empathy and suggestions reminding Patient addiction is a disease impacting all family members. Throughout this session, Patient's mood and affect were positive and upbeat, which is consistent with her report.     Treatment plan:  Target symptoms: depression, anxiety   Why chosen therapy is appropriate versus another modality: patient responds to this " modality  Outcome monitoring methods: self-report, observation  Therapeutic intervention type: behavior modifying psychotherapy, supportive psychotherapy    Risk parameters:  Patient reports no suicidal ideation  Patient reports no homicidal ideation  Patient reports no self-injurious behavior  Patient reports no violent behavior    Verbal deficits: None    Patient's response to intervention:  The patient's response to intervention is accepting, motivated.    Progress toward goals and other mental status changes:  The patient's progress toward goals is good.    Diagnosis:     ICD-10-CM ICD-9-CM   1. Recurrent major depressive disorder, in full remission  F33.42 296.36   2. Anxiety disorder, unspecified type  F41.9 300.00       Plan:  individual psychotherapy and medication management by physician    Return to clinic: 1 month    Length of Service (minutes): 60

## 2024-01-22 ENCOUNTER — PATIENT MESSAGE (OUTPATIENT)
Dept: PSYCHIATRY | Facility: CLINIC | Age: 34
End: 2024-01-22
Payer: COMMERCIAL

## 2024-01-24 ENCOUNTER — OFFICE VISIT (OUTPATIENT)
Dept: PSYCHIATRY | Facility: CLINIC | Age: 34
End: 2024-01-24
Payer: COMMERCIAL

## 2024-01-24 DIAGNOSIS — F41.9 ANXIETY DISORDER, UNSPECIFIED TYPE: ICD-10-CM

## 2024-01-24 DIAGNOSIS — F33.42 RECURRENT MAJOR DEPRESSIVE DISORDER, IN FULL REMISSION: Primary | ICD-10-CM

## 2024-01-24 PROCEDURE — 1159F MED LIST DOCD IN RCRD: CPT | Mod: CPTII,95,, | Performed by: SOCIAL WORKER

## 2024-01-24 PROCEDURE — 90832 PSYTX W PT 30 MINUTES: CPT | Mod: 95,,, | Performed by: SOCIAL WORKER
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
## 2024-02-06 ENCOUNTER — OFFICE VISIT (OUTPATIENT)
Dept: NEUROLOGY | Facility: CLINIC | Age: 34
End: 2024-02-06
Payer: COMMERCIAL

## 2024-02-06 ENCOUNTER — PATIENT MESSAGE (OUTPATIENT)
Dept: NEUROLOGY | Facility: CLINIC | Age: 34
End: 2024-02-06

## 2024-02-06 VITALS
WEIGHT: 124.75 LBS | SYSTOLIC BLOOD PRESSURE: 108 MMHG | HEART RATE: 88 BPM | DIASTOLIC BLOOD PRESSURE: 76 MMHG | HEIGHT: 62 IN | RESPIRATION RATE: 18 BRPM | BODY MASS INDEX: 22.96 KG/M2

## 2024-02-06 DIAGNOSIS — G43.019 INTRACTABLE MIGRAINE WITHOUT AURA AND WITHOUT STATUS MIGRAINOSUS: Primary | ICD-10-CM

## 2024-02-06 DIAGNOSIS — D35.2 PITUITARY ADENOMA: Chronic | ICD-10-CM

## 2024-02-06 DIAGNOSIS — G44.40 MEDICATION OVERUSE HEADACHE: ICD-10-CM

## 2024-02-06 DIAGNOSIS — M79.18 CERVICAL MYOFASCIAL PAIN SYNDROME: ICD-10-CM

## 2024-02-06 DIAGNOSIS — G43.019 INTRACTABLE MIGRAINE WITHOUT AURA AND WITHOUT STATUS MIGRAINOSUS: ICD-10-CM

## 2024-02-06 DIAGNOSIS — R06.83 SNORING: ICD-10-CM

## 2024-02-06 DIAGNOSIS — R41.3 MEMORY CHANGES: ICD-10-CM

## 2024-02-06 PROCEDURE — 99999 PR PBB SHADOW E&M-EST. PATIENT-LVL V: CPT | Mod: PBBFAC,,, | Performed by: NURSE PRACTITIONER

## 2024-02-06 PROCEDURE — 3078F DIAST BP <80 MM HG: CPT | Mod: CPTII,S$GLB,, | Performed by: NURSE PRACTITIONER

## 2024-02-06 PROCEDURE — 3008F BODY MASS INDEX DOCD: CPT | Mod: CPTII,S$GLB,, | Performed by: NURSE PRACTITIONER

## 2024-02-06 PROCEDURE — 1160F RVW MEDS BY RX/DR IN RCRD: CPT | Mod: CPTII,S$GLB,, | Performed by: NURSE PRACTITIONER

## 2024-02-06 PROCEDURE — 99205 OFFICE O/P NEW HI 60 MIN: CPT | Mod: S$GLB,,, | Performed by: NURSE PRACTITIONER

## 2024-02-06 PROCEDURE — 1159F MED LIST DOCD IN RCRD: CPT | Mod: CPTII,S$GLB,, | Performed by: NURSE PRACTITIONER

## 2024-02-06 PROCEDURE — 3074F SYST BP LT 130 MM HG: CPT | Mod: CPTII,S$GLB,, | Performed by: NURSE PRACTITIONER

## 2024-02-06 RX ORDER — DUPILUMAB 300 MG/2ML
300 INJECTION, SOLUTION SUBCUTANEOUS
COMMUNITY

## 2024-02-06 RX ORDER — TIZANIDINE 4 MG/1
TABLET ORAL
Qty: 30 TABLET | Refills: 11 | Status: SHIPPED | OUTPATIENT
Start: 2024-02-06 | End: 2024-05-29

## 2024-02-06 RX ORDER — SPIRONOLACTONE 100 MG/1
100 TABLET, FILM COATED ORAL DAILY
COMMUNITY
Start: 2023-12-18

## 2024-02-06 RX ORDER — UBROGEPANT 100 MG/1
TABLET ORAL
Qty: 16 TABLET | Refills: 11 | Status: SHIPPED | OUTPATIENT
Start: 2024-02-06

## 2024-02-06 RX ORDER — FREMANEZUMAB-VFRM 225 MG/1.5ML
225 INJECTION SUBCUTANEOUS
Qty: 1 EACH | Refills: 11 | Status: SHIPPED | OUTPATIENT
Start: 2024-02-06 | End: 2024-02-07 | Stop reason: SDUPTHER

## 2024-02-06 NOTE — PROGRESS NOTES
Date of service: 2/6/2024  Referring provider:     Subjective:      Chief complaint: Headache       Patient ID: Kaci Handley is a 33 y.o. female with anxiety, chronic fatigue, hemangioma of liver, insomnia, pituitary adenoma, depression who presents for new patient evaluation of headache     History of Present Illness    ORIGINAL HEADACHE HISTORY - 2/6/24  Age at onset and course over time: 2009 began with near daily afternoon headaches. She would have to lay down and sleep for them to resolve. Headaches went away for a couple years. Then came back 3-4 years ago.  Family history of headaches - none  Last eye exam - 1 year ago  Location: forehead, neck, temples   Quality:  [] pressure [] tight [x] throbbing [] sharp [] stabbing   Severity: current 1 with range 1-8  Duration:  hours  Frequency: 3 days per week  Headaches awaken at night?: yes    Worst time of day:  mid-day, evening   Associated with: [x] photophobia [x]  phonophobia [x] osmophobia [] blurred vision  [] double vision [x] loss of appetite [x] nausea [x] vomiting [x] dizziness [] vertigo  [x] tinnitus [x] irritability [x] sinus pressure [] problems with concentration   [x] neck tightness   Alleviated by:  [] sleep [x] darkness [x] massage [] heat [] ice [] medication  Exacerbated by:  [x] fatigue [] light [] noise [x] smells [] coughing [] sneezing  [] bending over [] ovulation [x] menses [] alcohol [x] change in weather [x]  stress  Ipsilateral autonomic: [] nasal congestion [] lacrimation [] ptosis [] injection [] edema [] foreign body sensation [] ear fullness   ICP:  [] transient visual obscurations  [x] tinnitus low pitched, bilateral   [] positional headache  [x] non-positional   Sleep habits: trouble staying asleep, unrefreshing sleep, snoring   Caffeine intake:  mg coffee in AM  Gyn status (if female): having periods, IUD  HIT 6: 65    Current acute treatment:  Zofran  Rizatriptan - 3-4 days per week  Tylenol XS - daily    Ibuprofen - near daily for about 10 months     Current prevention:  Cymbalta    Previously tried/failed acute treatment:  Tramadol  Naproxen  Flexeril  Sumatriptan  Nurtec - samples, sedating  Tylenol  ASA  Fioricet  Excedrin    Previously tried/failed preventative treatment:  Zyprexa  Fluoxetine  Trazodone  Topamax - 6-8 months with initial improvement then worsened   Amitriptyline - 6-8 months with initial improvement then worsened       Review of patient's allergies indicates:   Allergen Reactions    Covid-19 vac, bv (moderna)(pf) Other (See Comments)     Nausea, fever , myalgia     Current Outpatient Medications   Medication Sig Dispense Refill    DULoxetine (CYMBALTA) 60 MG capsule Take 1 capsule (60 mg total) by mouth once daily. 90 capsule 1    fexofenadine (ALLEGRA) 180 MG tablet Take 180 mg by mouth once daily.      LINZESS 72 mcg Cap capsule Take 72 mcg by mouth every morning.      montelukast (SINGULAIR) 10 mg tablet TAKE 1 TABLET BY MOUTH ONCE DAILY IN THE EVENING 90 tablet 3    ondansetron (ZOFRAN-ODT) 8 MG TbDL Take 1 tablet (8 mg total) by mouth 3 (three) times daily as needed (nauea or vomiting). 30 tablet 2    spironolactone (ALDACTONE) 100 MG tablet Take 100 mg by mouth once daily.      vitamin B complex (SUPER B COMPLEX-B-12 ORAL)       dupilumab (DUPIXENT PEN) 300 mg/2 mL PnIj Inject 300 mg into the skin every 14 (fourteen) days.      fremanezumab-vfrm (AJOVY AUTOINJECTOR) 225 mg/1.5 mL autoinjector Inject 1.5 mLs (225 mg total) into the skin every 28 days. 1 each 11    rizatriptan (MAXALT) 10 MG tablet Take 1 tablet (10 mg total) by mouth as needed for Migraine (take at onset of migraine,). May repeat in 2 hrs x 2 30 tablet 2    tiZANidine (ZANAFLEX) 4 MG tablet Take One-Half or full tablet by mouth at night as needed for muscle spasm 30 tablet 11    ubrogepant (UBRELVY) 100 mg tablet Take 1 tablet by mouth as needed for migraine. May repeat in 2 hours if needed. Max 2 tabs per day 16 tablet  11     No current facility-administered medications for this visit.       Past Medical History  Past Medical History:   Diagnosis Date    Abdominal pain, RLQ (right lower quadrant) 03/04/2013    Abnormal Pap smear of vagina     Allergy     Anemia     History of 2019 novel coronavirus disease (COVID-19) 12/08/2020    Hyperglycemia     Pituitary adenoma     Thyroid disease     thyroid level fluctuate    Well female exam with routine gynecological exam 11/21/2012       Past Surgical History  Past Surgical History:   Procedure Laterality Date    COLONOSCOPY  06/30/2017    F Rab[place]    CYST REMOVAL  2013    left wrist-benign    ENDOSCOPIC NASAL SEPTOPLASTY Bilateral 12/28/2018    Procedure: SEPTOPLASTY, NOSE, ENDOSCOPIC;  Surgeon: Sam Robin MD;  Location: [facility];  Service: ENT;  Laterality: Bilateral;    ENDOSCOPIC NASAL SEPTOPLASTY  08/05/2020    FUNCTIONAL ENDOSCOPIC SINUS SURGERY (FESS) USING COMPUTER-ASSISTED NAVIGATION Bilateral 12/28/2018    Procedure: FESS, USING COMPUTER-ASSISTED NAVIGATION;  Surgeon: Sam Robin MD;  Location: [facility];  Service: ENT;  Laterality: Bilateral;    NASAL RECONSTRUCTION  08/05/2020    with graft    NASAL TURBINATE REDUCTION Bilateral 12/28/2018    Procedure: REDUCTION, NASAL TURBINATE;  Surgeon: Sam Robin MD;  Location: [facility] OR;  Service: ENT;  Laterality: Bilateral;    NASAL TURBINATE REDUCTION  08/05/2020    POLYPECTOMY Left 2/28/2023    Procedure: POLYPECTOMY/ Nasal;  Surgeon: Eduardo Rivas MD;  Location: [facility];  Service: ENT;  Laterality: Left;    TONSILLECTOMY  1998    WISDOM TOOTH EXTRACTION  2009       Family History  Family History   Problem Relation Age of Onset    ADD / ADHD Mother     Bipolar disorder Mother     Allergies Mother     Drug abuse Mother     Retinal detachment Mother     Crohn's disease Father     Drug abuse Father     Alcohol abuse Father     No Known Problems Sister     Breast cancer Maternal Grandmother     Cancer Maternal  Grandmother     Diabetes Maternal Grandfather     Diabetes Paternal Grandmother     COPD Paternal Grandfather     Ovarian cancer Neg Hx     Glaucoma Neg Hx     Macular degeneration Neg Hx        Social History  Social History     Socioeconomic History    Marital status:      Spouse name: Dallas Handley    Number of children: 0   Occupational History    Occupation: medical assistant     Employer: OTHER     Comment: [employer]   Tobacco Use    Smoking status: Former     Current packs/day: 0.00     Average packs/day: 0.3 packs/day for 3.0 years (0.8 ttl pk-yrs)     Types: Cigarettes     Start date: 10/21/2015     Quit date: 10/21/2018     Years since quittin.2    Smokeless tobacco: Never   Substance and Sexual Activity    Alcohol use: No    Drug use: Never    Sexual activity: Yes     Partners: Male     Birth control/protection: Condom, I.U.D.     Social Determinants of Health     Financial Resource Strain: Low Risk  (2024)    Overall Financial Resource Strain (CARDIA)     Difficulty of Paying Living Expenses: Not very hard   Food Insecurity: No Food Insecurity (2024)    Hunger Vital Sign     Worried About Running Out of Food in the Last Year: Never true     Ran Out of Food in the Last Year: Never true   Transportation Needs: No Transportation Needs (2024)    PRAPARE - Transportation     Lack of Transportation (Medical): No     Lack of Transportation (Non-Medical): No   Physical Activity: Sufficiently Active (2024)    Exercise Vital Sign     Days of Exercise per Week: 5 days     Minutes of Exercise per Session: 60 min   Stress: Stress Concern Present (2024)    [employer] - Occupational Stress Questionnaire     Feeling of Stress : To some extent   Social Connections: Unknown (2024)    Social Connection and Isolation Panel [NHANES]     Frequency of Communication with Friends and Family: More than three times a week     Frequency of Social Gatherings with  Friends and Family: More than three times a week     Active Member of Clubs or Organizations: No     Attends Club or Organization Meetings: Patient declined     Marital Status:    Housing Stability: Low Risk  (1/24/2024)    Housing Stability Vital Sign     Unable to Pay for Housing in the Last Year: No     Number of Places Lived in the Last Year: 1     Unstable Housing in the Last Year: No        Review of Systems  14-point review of systems as follows:   No check manish indicates NEGATIVE response   Constitutional: [] weight loss, [] change to appetite   Eyes: [] change in vision, [] double vision   Ears, nose, mouth, throat: [] frequent nose bleeds, [x] ringing in the ears   Respiratory: [] cough, [] wheezing   Cardiovascular: [] chest pain, [] palpitations   Gastrointestinal: [] jaundice, [] nausea/vomiting   Genitourinary: [] incontinence, [] burning with urination   Hematologic/lymphatic: [] easy bruising/bleeding, [x] night sweats   Neurological: [] numbness, [] weakness   Endocrine: [x] fatigue, [] heat/cold intolerance   Allergy/Immunologic: [] fevers, [] chills   Musculoskeletal: [] muscle pain, [] joint pain   Psychiatric: [] thoughts of harming self/others, [x] depression   Integumentary: [] rashes, [] sores that do not heal     Objective:        Vitals:    02/06/24 0800   BP: 108/76   Pulse: 88   Resp: 18     Body mass index is 22.82 kg/m².    2/6/24  Constitutional: appears in no acute distress, well-developed, well-nourished     Eyes: normal conjunctiva, PERRLA    Ears, nose, mouth, throat: external appearance of ears and nose normal, hearing intact. Tongue scalloping      Cardiovascular: regular rate and rhythm, no murmurs appreciated    Respiratory: unlabored respirations, breath sounds normal bilaterally    Gastrointestinal: no visible abdominal masses, no guarding, no visible hernia    Musculoskeletal: normal tone in all four extremities. No abnormal movements. No pronator drift. No orbit.  Symmetric finger tapping. Normal station. Normal regular gait. Normal tandem gait.      Spine:   CERVICAL SPINE:  ROM: normal   MUSCLE SPASM: mild, in all planes    FACET LOADING: no   SPURLING: no  RENE / LAURI tender: no     Psychiatric: normal judgment and insight. Oriented to person, place, and time.     Neurologic:   Cortical functions: recent and remote memory intact, normal attention span and concentration, speech fluent, adequate fund of knowledge   Cranial nerves: visual fields full, PERRLA, EOMI, symmetric facial strength, hearing intact, palate elevates symmetrically, shoulder shrug 5/5, tongue protrudes midline   Reflexes: 2+ in the upper and lower extremities, no Mccormack  Sensation: intact to temperature throughout   Coordination: normal finger to nose, heel to shin, tandem gait     Data Review:     I have personally reviewed the referring provider's notes, labs, & imaging made available to me today.      RADIOLOGY STUDIES:  I have personally reviewed the pertinent images performed.       Results for orders placed or performed during the hospital encounter of 01/05/24   CT Head Without Contrast    Narrative    EXAMINATION:  CT HEAD WITHOUT CONTRAST    CLINICAL HISTORY:  Head trauma, moderate-severe;    TECHNIQUE:  Low dose axial CT images obtained throughout the head without intravenous contrast. Sagittal and coronal reconstructions were performed.    COMPARISON:  MRI 01/30/2021; CT 01/25/2021    FINDINGS:  Intracranial compartment:    Ventricles and sulci are normal in size for age without evidence of hydrocephalus. No extra-axial blood or fluid collections.    No acute parenchymal hemorrhage or evolving major vascular distribution infarct.  No intracranial mass effect or midline shift.    Skull/extracranial contents (limited evaluation): No fracture.  Partially visualized mucosal thickening in the right maxillary antrum.  Mastoid air cells and remaining paranasal sinuses are essentially clear.       Impression    No acute intracranial abnormality.      Electronically signed by: Domo Belcher  Date:    01/05/2024  Time:    20:35       Lab Results   Component Value Date     01/05/2024    K 3.9 01/05/2024     01/05/2024    CO2 24 01/05/2024    BUN 19 (H) 01/05/2024    CREATININE 0.65 01/05/2024    GLU 97 01/05/2024    HGBA1C 4.9 03/07/2016    AST 26 01/05/2024    AST 16 03/07/2016    ALT 21 01/05/2024    ALBUMIN 5.0 01/05/2024    PROT 8.9 (H) 01/05/2024    BILITOT 0.4 01/05/2024    CHOL 170 12/15/2023    HDL 56 12/15/2023    LDLCALC 106.8 12/15/2023    TRIG 36 12/15/2023       Lab Results   Component Value Date    WBC 18.09 (H) 01/05/2024    HGB 14.5 01/05/2024    HCT 42.6 01/05/2024    MCV 89 01/05/2024     01/05/2024       Lab Results   Component Value Date    TSH 1.400 12/15/2023           Assessment & Plan:       Problem List Items Addressed This Visit          Neuro    Intractable migraine without aura and without status migrainosus - Primary    Overview     Migraine headaches since 2009. Headaches are typically unilateral, moderate to severe in intensity, worsen with activity, pounding in quality and associated with sensitivity to light, smell and sound. Gradual progression pattern, lack of red flag features on history, and normal neurological exam are reassuring for primary as opposed to secondary etiology of headaches thus imaging will not be pursued for this history and this exam at this time.    We discussed options for prevention. Previously failed Topamax and amitriptyline. Add CGRP. Avoid Aimovig and Qulipta due to baseline IBS with constipation. Start with Ajovy.    For acute attacks trial Ubrelvy. Discussed need to stop OTC.           Relevant Medications    fremanezumab-vfrm (AJOVY AUTOINJECTOR) 225 mg/1.5 mL autoinjector    ubrogepant (UBRELVY) 100 mg tablet       Endocrine    Pituitary adenoma (Chronic)    Current Assessment & Plan     Has seen endocrinology. Now followed by  primary care.           Other Visit Diagnoses       Memory changes        MOCA screen in clinic normal.    Cervical myofascial pain syndrome        Relevant Medications    tiZANidine (ZANAFLEX) 4 MG tablet    Other Relevant Orders    Ambulatory referral/consult to Physical/Occupational Therapy    Snoring        Relevant Orders    Ambulatory referral/consult to Sleep Disorders    Medication overuse headache        Near daily Tylenol and ibuprofen                Please call our clinic at [phone] or send a message on the [employer] portal if there are any changes to the plan described below, for example,if you are not contacted for the requested tests, referral(s) within one week, if you are unable to receive the medications prescribed, or if you feel you need to change the treatment course for any reason.     TESTING:  -- sleep study    REFERRALS:  -- sleep specialist  -- physical therapy    PREVENTION (use daily regardless of headache):  -- START Ajovy once monthly  -- start magnesium in ONE of the following preparations -               1. Magnesium oxide 800mg daily (the most common over the counter kind, may causes loose stools)              2. Magnesium citrate 400-500mg daily (harder to find, but more neutral on the bowels)              3. Magnesium glycinate 400mg daily (hardest to find, look online, but most bowel-neutral, best absorbed)     AS-NEEDED TREATMENT (use total no more than 10 days per month unless otherwise stated):  -- START Ubrelvy with next migraine. You can repeat two hours later if needed. With this medication do not drink grapefruit juice or eat grapefruit or some medications like ketoconazole, itraconazole, or antibiotics clarithromycin  -- START tizanidine at night. This is a muscle relaxer and it will also make you potentially sleepy. Start with half a tablet to see how you respond but can take up to a whole tablet if needed      Follow up in about 3 months (around 5/6/2024).    Maribel Harvey  OTTO Wick, NP

## 2024-02-06 NOTE — PATIENT INSTRUCTIONS
Please call our clinic at [phone] or send a message on the [employer] portal if there are any changes to the plan described below, for example,if you are not contacted for the requested tests, referral(s) within one week, if you are unable to receive the medications prescribed, or if you feel you need to change the treatment course for any reason.     TESTING:  -- sleep study    REFERRALS:  -- sleep specialist  -- physical therapy    PREVENTION (use daily regardless of headache):  -- START Ajovy once monthly  -- start magnesium in ONE of the following preparations -               1. Magnesium oxide 800mg daily (the most common over the counter kind, may causes loose stools)              2. Magnesium citrate 400-500mg daily (harder to find, but more neutral on the bowels)              3. Magnesium glycinate 400mg daily (hardest to find, look online, but most bowel-neutral, best absorbed)     AS-NEEDED TREATMENT (use total no more than 10 days per month unless otherwise stated):  -- START Ubrelvy with next migraine. You can repeat two hours later if needed. With this medication do not drink grapefruit juice or eat grapefruit or some medications like ketoconazole, itraconazole, or antibiotics clarithromycin  -- START tizanidine at night. This is a muscle relaxer and it will also make you potentially sleepy. Start with half a tablet to see how you respond but can take up to a whole tablet if needed

## 2024-02-07 ENCOUNTER — TELEPHONE (OUTPATIENT)
Dept: NEUROLOGY | Facility: CLINIC | Age: 34
End: 2024-02-07
Payer: COMMERCIAL

## 2024-02-07 RX ORDER — FREMANEZUMAB-VFRM 225 MG/1.5ML
225 INJECTION SUBCUTANEOUS
Qty: 1 EACH | Refills: 11 | Status: SHIPPED | OUTPATIENT
Start: 2024-02-07

## 2024-02-08 ENCOUNTER — PATIENT MESSAGE (OUTPATIENT)
Dept: NEUROLOGY | Facility: CLINIC | Age: 34
End: 2024-02-08
Payer: COMMERCIAL

## 2024-02-16 ENCOUNTER — OFFICE VISIT (OUTPATIENT)
Dept: OPTOMETRY | Facility: CLINIC | Age: 34
End: 2024-02-16
Payer: COMMERCIAL

## 2024-02-16 DIAGNOSIS — H52.203 MYOPIA OF BOTH EYES WITH ASTIGMATISM: Primary | ICD-10-CM

## 2024-02-16 DIAGNOSIS — Z83.518 FAMILY HISTORY OF RETINAL DETACHMENT: ICD-10-CM

## 2024-02-16 DIAGNOSIS — D35.2 PITUITARY ADENOMA: Chronic | ICD-10-CM

## 2024-02-16 DIAGNOSIS — H52.13 MYOPIA OF BOTH EYES WITH ASTIGMATISM: Primary | ICD-10-CM

## 2024-02-16 PROCEDURE — 1159F MED LIST DOCD IN RCRD: CPT | Mod: CPTII,S$GLB,, | Performed by: OPTOMETRIST

## 2024-02-16 PROCEDURE — 99499 UNLISTED E&M SERVICE: CPT | Mod: S$GLB,,, | Performed by: OPTOMETRIST

## 2024-02-16 PROCEDURE — 92015 DETERMINE REFRACTIVE STATE: CPT | Mod: S$GLB,,, | Performed by: OPTOMETRIST

## 2024-02-16 PROCEDURE — 1160F RVW MEDS BY RX/DR IN RCRD: CPT | Mod: CPTII,S$GLB,, | Performed by: OPTOMETRIST

## 2024-02-16 PROCEDURE — 92014 COMPRE OPH EXAM EST PT 1/>: CPT | Mod: S$GLB,,, | Performed by: OPTOMETRIST

## 2024-02-16 PROCEDURE — 99999 PR PBB SHADOW E&M-EST. PATIENT-LVL III: CPT | Mod: PBBFAC,,, | Performed by: OPTOMETRIST

## 2024-02-16 NOTE — PROGRESS NOTES
HPI     Concerns About Ocular Health     Additional comments: Pt. Here for ocular health exam            Comments    DLE - 2/17/2023    Pt. States she has not noticed any significant changes in [facility]. Wears CL   on occasion.   Not currently using gtts.   Pt. Denies F/F.          Last edited by Ryland Ahn MA on 2/16/2024  3:31 PM.            Assessment /Plan     For exam results, see Encounter Report.    Myopia of both eyes with astigmatism    Family history of retinal detachment    Pituitary adenoma      New Spectacle Rx given and Contact lens Rx released to pt. Daily wear only advised, with education to risks of extended wear.  Discussed lens care, compliance and solutions. RTC yearly contact lens follow up.  discussed different options for glasses. RTC 1 year routine eye exam.  2. Monitor condition. Patient to report any changes. RTC 1 year recheck.  3. No periph vision compliants, due for scan with endo.

## 2024-02-17 ENCOUNTER — PATIENT MESSAGE (OUTPATIENT)
Dept: OPTOMETRY | Facility: CLINIC | Age: 34
End: 2024-02-17
Payer: COMMERCIAL

## 2024-03-22 ENCOUNTER — PATIENT MESSAGE (OUTPATIENT)
Dept: PSYCHIATRY | Facility: CLINIC | Age: 34
End: 2024-03-22
Payer: COMMERCIAL

## 2024-03-22 ENCOUNTER — TELEPHONE (OUTPATIENT)
Dept: PSYCHIATRY | Facility: CLINIC | Age: 34
End: 2024-03-22
Payer: COMMERCIAL

## 2024-03-26 ENCOUNTER — OFFICE VISIT (OUTPATIENT)
Dept: PSYCHIATRY | Facility: CLINIC | Age: 34
End: 2024-03-26
Payer: COMMERCIAL

## 2024-03-26 VITALS
WEIGHT: 126.13 LBS | BODY MASS INDEX: 23.21 KG/M2 | HEIGHT: 62 IN | SYSTOLIC BLOOD PRESSURE: 105 MMHG | HEART RATE: 81 BPM | DIASTOLIC BLOOD PRESSURE: 74 MMHG

## 2024-03-26 DIAGNOSIS — F41.9 ANXIETY DISORDER, UNSPECIFIED TYPE: ICD-10-CM

## 2024-03-26 DIAGNOSIS — F33.1 MODERATE EPISODE OF RECURRENT MAJOR DEPRESSIVE DISORDER: Primary | ICD-10-CM

## 2024-03-26 PROCEDURE — 3078F DIAST BP <80 MM HG: CPT | Mod: CPTII,S$GLB,,

## 2024-03-26 PROCEDURE — 1159F MED LIST DOCD IN RCRD: CPT | Mod: CPTII,S$GLB,,

## 2024-03-26 PROCEDURE — 3008F BODY MASS INDEX DOCD: CPT | Mod: CPTII,S$GLB,,

## 2024-03-26 PROCEDURE — 99215 OFFICE O/P EST HI 40 MIN: CPT | Mod: S$GLB,,,

## 2024-03-26 PROCEDURE — 1160F RVW MEDS BY RX/DR IN RCRD: CPT | Mod: CPTII,S$GLB,,

## 2024-03-26 PROCEDURE — 99999 PR PBB SHADOW E&M-EST. PATIENT-LVL III: CPT | Mod: PBBFAC,,,

## 2024-03-26 PROCEDURE — 3074F SYST BP LT 130 MM HG: CPT | Mod: CPTII,S$GLB,,

## 2024-03-26 RX ORDER — DULOXETIN HYDROCHLORIDE 60 MG/1
60 CAPSULE, DELAYED RELEASE ORAL DAILY
Qty: 90 CAPSULE | Refills: 1 | Status: SHIPPED | OUTPATIENT
Start: 2024-03-26

## 2024-03-26 RX ORDER — BUPROPION HYDROCHLORIDE 150 MG/1
150 TABLET ORAL DAILY
Qty: 90 TABLET | Refills: 0 | Status: SHIPPED | OUTPATIENT
Start: 2024-03-26 | End: 2024-05-06

## 2024-03-26 NOTE — PROGRESS NOTES
"OUTPATIENT PSYCHIATRY FOLLOW UP VISIT    Encounter Date: 3/26/2024    Clinical Status of Patient:  Outpatient (Ambulatory)    Chief Complaint:  Kaci Handley is a 33 y.o. female who presents today for follow-up.  Met with patient.      HISTORY OF PRESENTING ILLNESS:  Kaci Handley is a 33 y.o. female with history of unspecified depressive disorder and unspecified anxiety disorder who presents for follow up appointment.      INITIAL HPI:  Patient reports history of depression and anxiety beginning at age 14.  She notes she was started on fluoxetine at that time which caused no moods whatsoever, dry personality." She reports no difficulty with anxiety or depression between age 14 and 2020.  In 2020 she moved in with her 's parents as her  is pursuing his master's degree in history currently.  Prior to getting  patient was living alone in her own residence with her cat.  When she and her  moved in with her in-laws she was forced to get rid of her cat because her mother-in-law has 2 dogs.  Patient reports she began to develop depressed mood around that time and was started on duloxetine by her PCP.  Patient states she returned to her PCP noting the duloxetine did not resolve her depressed mood so her PCP diagnosed her with bipolar disorder and started olanzapine.     Patient reports her mother-in-law is passive-aggressive narcissistic.  She frequently tries to make the patient feel guilty for having any leisure time at all.  The patient's mother does not act in the same way in front of the patient's  as she does around only the patient.  Patient states her  is not supportive.   His mom pays his cell phone bill and car insurance so he is not in a hurry to move out."  Patient states her depressed mood is situational and depended upon her mother-in-law's treatment of her.    7/19/2023: Pt denies difficulty tapering olanzapine to discontinuation. She " "did experience one day of sadness and irritability with 3 episodes of crying after discontinuation of olanzapine which has since resolved. Denies further sad mood, irritability, or crying spells.   Pt states she is "Feeling much better! I feel really good." She reports improvement in her ability to wake up in the morning and is no longer experiencing drowsiness. She has more energy during the day and is no longer feeling tired during the day, "I don't feel like I need to take a nap all day anymore."  Appetite is decreased and Pt has lost 3 lbs over the last week since d/c olanzapine.  Pt continues to report fidgeting (e.g. rubbing her hands together, twisting earrings, "twitching" during the night).  AIMS today =1  Pt reports recent improvement in relationship with her mother-in-law. She has been supportive of the Pt and her mental health.     9/20/2023: "I've been really good." Mood has been stable. Denies irritability, low mood, or crying spells.   Continues fidgeting, "But it might just be my anxiety, I've always done that. I tap my feet or fingers".   Getting along with mother-in-law. "It's up and down, she has her days, but were doing well right now."   Continues therapy with SORAYA Mancini LCSW    Plan at last appointment:   Continue Cymbalta 60 mg daily for anxiety and depression  Continue individual psychotherapy with SORAYA Mancini LCSW    Psychotropic medication history:   Prozac (emotional blunting)  Trintellix (ineffective)  Lamictal (rash)  Trazodone (effective but drowsiness in AM)  Olanzapine 5 mg daily (sedation, fatigue, wt gain)      INTERVAL HISTORY:    Pt reports her mood has been, "Overwhelmed." She notes some low mood and anxiety related to her mother and sister. Her mother has a hx of substance abuse and is using again. Pt's younger sister, who is 12, was staying with her grandmother d/t her mother's substance use, but she is using THC and has been violent with the grandmother so she is living with her " "mother again. Pt's , [place], recently completed one master's degree but is starting work on another master's degree in the fall. He is not working so the Pt is the sole provider of income. He was offered a job at a museum but turned this down as his mother is paying him to stay home with her new puppy. The Pt's mother-in-law expects the patient to clean the house and treats her like a maid, while her own children are not expected to help with housework. "I used to enjoy going to [employer], but now it's just another chore because I have no time to relax and I don't want it to be like that." Pt also reports stress at work where she is frustrated with her colleagues' lack of initiative at work. She has had to decrease the frequency of psychotherapy appointments due to her current financial situation.  She does continue to exercise several times per week.     3/26/24 AIMS = 0  7/23 AIMS =1    No interval episodes with symptoms consistent with je or hypomania.  Denied interval or current suicidal/homicidal thoughts, intent, or plan or NSSI.  Denied other questions and concerns.  Patient reports feeling stable and wishing to continue current management unchanged.    Medication side effects: see above  Medication adherence: yes    PSYCHIATRIC REVIEW OF SYSTEMS:  Is patient experiencing or having changes in:  Mood: "overwhelmed"  Anhedonia: no  Guilt: no  Sleep: difficulty maintaining sleep, waking up every hour  Energy: reports fatigue  Concentration: no  Appetite: "normal" has lost 27 lbs since d/c olanzapine; Cravings for high sugar foods have resolved  Psychomotor: no  SI: no     Anxiety: increasing  Worry: no  Panic attacks: no  Restlessness/'on edge': no  Irritability: no  Muscle tension: no  Avoidance: no  Agoraphobia: no  Social phobia: no  Recurrent nightmares: no  Hyper startle response: no   Dissociative episodes: no  Recurrent thoughts: no  Recurrent behaviors: no     Distractibility: no  Indiscretion: " no  Grandiosity: no   Racing thoughts/Flight of ideas: no  Increased activity: no  Reduced need for sleep: no  Talkativeness/Pressured speech: no      A/V hallucinations: no  Delusions: no  Paranoia: no    MEDICAL REVIEW OF SYSTEMS:   Pain: Denies any significant chronic or acute pain.  Constitutional: Denies fever or change in appetite.  Cardiovascular: Denies chest pain or exertional dyspnea.  Respiratory: Denies cough or orthopnea.   GI: Denies abdominal pain, N/V  Neurological: Denies tremor, seizure, or focal weakness.  Psychiatric: See HPI above.    PAST PSYCHIATRIC, MEDICAL, AND SOCIAL HISTORY REVIEWED  The patient's past medical, family and social history have been reviewed and updated as appropriate within the electronic medical record - see encounter notes.    PAST MEDICAL HISTORY:   Past Medical History:   Diagnosis Date    Abdominal pain, RLQ (right lower quadrant) 03/04/2013    Abnormal Pap smear of vagina     Allergy     Anemia     History of 2019 novel coronavirus disease (COVID-19) 12/08/2020    Hyperglycemia     Pituitary adenoma     Thyroid disease     thyroid level fluctuate    Well female exam with routine gynecological exam 11/21/2012    Wish to become pregnant in the immediate future[if female of childbearing age]: no     NEUROLOGIC HISTORY:  Seizures:  once in 2014, has not had any since   Head trauma:  denies    PAST PSYCHIATRIC HISTORY:  Psychiatric Care (current & past):  psychiatrist at age 14  Previous Psychiatric Diagnoses:  MDD, TATA, bipolar disorder  Previous Psychiatric Hospitalizations: denies  Previous SI/HI:   denies  Previous Suicide Attempts or NSSI: denies  History of Psychotherapy: therapy at age 14  History of Violence: denies    FAMILY HISTORY:   Paternal: unknown, Pt only rarely speaks to father, he uses drugs  Maternal: mother bipolar and heavy drug user    SOCIAL HISTORY:   Developmental/Childhood: born and raised in [place], moved after [place] to [facility], then  [place], lived with grandmother at age 19  Marital Status/Relationship Status:  3 years, has been and will graduate with master's degree in history in approximately 6 months  Children: denies  Resides/Housing Status: [place] in her 's parent's house with her , her 's parents, her 's sister, and her 's sister's daughter  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  Hobbies/Recreational Activities: gym to workout  Spirituality/Episcopalian: [employer], practicing  Education level: HS graduate, MA certification   History: 8 months in [employer], was d/c with an injury  Legal History: denies  Access to firearms: yes, some are kept in a safe, Pt has one in her vehicle for self defense     SUBSTANCE USE HISTORY:  Caffeine: 1-2 cups coffee in AM  Tobacco: denies  Alcohol:  very rarely, maybe like once per year, hemangioma on liver  Other Substances: denies  Rehab: denies  Detoxes:  denies    MEDICATIONS:    Current Outpatient Medications:     dupilumab (DUPIXENT PEN) 300 mg/2 mL PnIj, Inject 300 mg into the skin every 14 (fourteen) days., Disp: , Rfl:     fexofenadine (ALLEGRA) 180 MG tablet, Take 180 mg by mouth once daily., Disp: , Rfl:     fremanezumab-vfrm (AJOVY AUTOINJECTOR) 225 mg/1.5 mL autoinjector, Inject 1.5 mLs (225 mg total) into the skin every 28 days., Disp: 1 each, Rfl: 11    LINZESS 72 mcg Cap capsule, Take 72 mcg by mouth every morning., Disp: , Rfl:     montelukast (SINGULAIR) 10 mg tablet, TAKE 1 TABLET BY MOUTH ONCE DAILY IN THE EVENING, Disp: 90 tablet, Rfl: 3    ondansetron (ZOFRAN-ODT) 8 MG TbDL, Take 1 tablet (8 mg total) by mouth 3 (three) times daily as needed (nauea or vomiting)., Disp: 30 tablet, Rfl: 2    spironolactone (ALDACTONE) 100 MG tablet, Take 100 mg by mouth once daily., Disp: , Rfl:     tiZANidine (ZANAFLEX) 4 MG tablet, Take One-Half or full tablet by mouth at night as needed for muscle spasm, Disp: 30 tablet, Rfl: 11     "ubrogepant (UBRELVY) 100 mg tablet, Take 1 tablet by mouth as needed for migraine. May repeat in 2 hours if needed. Max 2 tabs per day, Disp: 16 tablet, Rfl: 11    buPROPion (WELLBUTRIN XL) 150 MG TB24 tablet, Take 1 tablet (150 mg total) by mouth once daily., Disp: 90 tablet, Rfl: 0    DULoxetine (CYMBALTA) 60 MG capsule, Take 1 capsule (60 mg total) by mouth once daily., Disp: 90 capsule, Rfl: 1    rizatriptan (MAXALT) 10 MG tablet, Take 1 tablet (10 mg total) by mouth as needed for Migraine (take at onset of migraine,). May repeat in 2 hrs x 2, Disp: 30 tablet, Rfl: 2    ALLERGIES:  Review of patient's allergies indicates:   Allergen Reactions    Covid-19 vac, bv (moderna)(pf) Other (See Comments)     Nausea, fever , myalgia       EXAM:  Constitutional  Vitals:  Most recent vital signs were reviewed.   Last 3 sets of VS:      2/6/2024     8:00 AM 2/16/2024     3:31 PM 3/26/2024    11:59 AM   Vitals - 1 value per visit   SYSTOLIC 108  105   DIASTOLIC 76  74   Pulse 88  81   Resp 18     Weight (lb) 124.78  126.1   Weight (kg) 56.6  57.2   Height 5' 2" (1.575 m)  5' 2" (1.575 m)   BMI (Calculated) 22.8  23.1   Pain Score Zero Zero Zero      General:  unremarkable, age appropriate     Musculoskeletal  Muscle Strength/Tone:  No tremor or abnormal movements   Gait & Station:  Steady, non-ataxic     Psychiatric  Speech:  no latency; no press   Mood & Affect:  anxious, sad  congruent and appropriate   Thought Process:  normal and logical   Associations:  intact   Thought Content:  normal, no suicidality, no homicidality, delusions, or paranoia   Insight:  intact   Judgement: behavior is adequate to circumstances   Orientation:  grossly intact   Memory: intact for content of interview   Language: grossly intact   Attention Span & Concentration:  Intact to interview   Fund of Knowledge:  Not tested     SUICIDE RISK ASSESSMENT:  Protective factors: age, gender, no prior attempts, no prior hospitalizations, no family h/o " attempts, no ongoing substance abuse, no psychosis, , has children, denies SI/intent/plan, seeking treatment, access to treatment, future oriented  Risks:  Access to firearms, hx depression and anxiety  Patient is a low immediate and long-term risk considering risk factors.        RELEVANT LABS/STUDIES:    Lab Results   Component Value Date    WBC 18.09 (H) 01/05/2024    HGB 14.5 01/05/2024    HCT 42.6 01/05/2024    MCV 89 01/05/2024     01/05/2024     BMP  Lab Results   Component Value Date     01/05/2024    K 3.9 01/05/2024     01/05/2024    CO2 24 01/05/2024    BUN 19 (H) 01/05/2024    CREATININE 0.65 01/05/2024    CALCIUM 9.8 01/05/2024    ANIONGAP 11 01/05/2024    ESTGFRAFRICA >60 06/18/2022    EGFRNONAA >60 06/18/2022     Lab Results   Component Value Date    ALT 21 01/05/2024    AST 26 01/05/2024    ALKPHOS 93 01/05/2024    BILITOT 0.4 01/05/2024     Lab Results   Component Value Date    TSH 1.400 12/15/2023     Lab Results   Component Value Date    HGBA1C 4.9 03/07/2016     Lab Results   Component Value Date    CHOL 170 12/15/2023    TRIG 36 12/15/2023    HDL 56 12/15/2023    LDLCALC 106.8 12/15/2023    CHOLHDL 32.9 12/15/2023    TOTALCHOLEST 3.0 12/15/2023       IMPRESSION:    Kaci Handley is a 33 y.o. female with history of unspecified depressive disorder and unspecified anxiety disorder who presents for follow up appointment.    Status/Progress: Based on the examination today, the patient's problem(s) is/are adequately but not ideally controlled and worsening.  New problems have not been presented today.   Co-morbidities are not complicating management of the primary condition.  There are no active rule-out diagnoses for this patient at this time.     Risk Parameters:  Patient reports no suicidal ideation  Patient reports no homicidal ideation  Patient reports no self-injurious behavior  Patient reports no violent behavior    DIAGNOSES:    ICD-10-CM ICD-9-CM   1.  Moderate episode of recurrent major depressive disorder  F33.1 296.32   2. Anxiety disorder, unspecified type  F41.9 300.00       PLAN:  Continue duloxetine 60 mg daily for anxiety and depression  Start bupropion  mg q.a.m. for mood  Continue individual psychotherapy with SORAYA Mancini LCSW    RETURN TO CLINIC:   6 weeks or sooner p.r.n.       Niyah Augustine, HESHAM, PMP-BC    44 minutes of total time spent on the encounter, which includes face to face time and non-face to face time preparing to see the patient (eg. review of tests), obtaining and/or reviewing separately obtained history, documenting clinical information in the electronic health record, independently interpreting results (not separately reported), and communicating results to the patient/family/caregiver, or care coordination (not separately reported).     Visit today included managing the longitudinal care of the patient due to the serious and/or complex managed problem(s) MDD and unspecified anxiety disorder.    At this time there are no indications the patient represents an imminent danger to either themselves or others; will continue to manage treatment in the outpatient setting.    I discussed the patient's care with the patient including benefits, alternatives, possible adverse effects of the treatment plan; including the potential for metabolic complications, major organ dysfunction, black box warnings, and contraindications. The opportunity was given for questions/clarification, and after this discussion the above treatment plan was devised through shared decision making. The patient voiced their understanding of the diagnoses and treatments listed above and agreed to the treatment plan. Follow up plan was reviewed with the patient. The patient was advised to call to report any worsening of symptoms or problems with medication.    Supportive therapy and psychoeducation provided. Patient has been given crisis information including Suicide  "and Crisis Lifeline (call or text: 106). Patient also given instructions to go to the nearest ER or call 911 if unable to remain safe or if the Pt develops thoughts of harming self or others.    Documentation entered by me for this encounter may have been done in part using "[employer] Direct voice recognition transcription software. Garbled syntax, mangled pronouns, and other bizarre constructions may be attributed to that software system. Although I have made an effort to ensure accuracy, "sound like" errors may exist and should be interpreted in context.    "

## 2024-04-30 ENCOUNTER — PATIENT MESSAGE (OUTPATIENT)
Dept: NEUROLOGY | Facility: CLINIC | Age: 34
End: 2024-04-30
Payer: COMMERCIAL

## 2024-05-02 ENCOUNTER — TELEPHONE (OUTPATIENT)
Dept: PSYCHIATRY | Facility: CLINIC | Age: 34
End: 2024-05-02
Payer: COMMERCIAL

## 2024-05-06 ENCOUNTER — PATIENT MESSAGE (OUTPATIENT)
Dept: PSYCHIATRY | Facility: CLINIC | Age: 34
End: 2024-05-06
Payer: COMMERCIAL

## 2024-05-06 ENCOUNTER — OFFICE VISIT (OUTPATIENT)
Dept: PSYCHIATRY | Facility: CLINIC | Age: 34
End: 2024-05-06
Payer: COMMERCIAL

## 2024-05-06 DIAGNOSIS — F33.1 MODERATE EPISODE OF RECURRENT MAJOR DEPRESSIVE DISORDER: Primary | ICD-10-CM

## 2024-05-06 DIAGNOSIS — F41.9 ANXIETY DISORDER, UNSPECIFIED TYPE: ICD-10-CM

## 2024-05-06 PROCEDURE — G2211 COMPLEX E/M VISIT ADD ON: HCPCS | Mod: 95,,,

## 2024-05-06 PROCEDURE — 1160F RVW MEDS BY RX/DR IN RCRD: CPT | Mod: CPTII,95,,

## 2024-05-06 PROCEDURE — 1159F MED LIST DOCD IN RCRD: CPT | Mod: CPTII,95,,

## 2024-05-06 PROCEDURE — 99214 OFFICE O/P EST MOD 30 MIN: CPT | Mod: 95,,,

## 2024-05-06 RX ORDER — BUPROPION HYDROCHLORIDE 300 MG/1
300 TABLET ORAL DAILY
Qty: 90 TABLET | Refills: 0 | Status: SHIPPED | OUTPATIENT
Start: 2024-05-06 | End: 2025-05-06

## 2024-05-06 NOTE — PROGRESS NOTES
"OUTPATIENT PSYCHIATRY FOLLOW UP VISIT    Encounter Date: 5/6/2024    Clinical Status of Patient:  Outpatient (Virtual)  The patient location is: [address] [place] [address]  The patient phone number is: [phone]   Visit type: Virtual visit with synchronous audio and video  Each patient to whom he or she provides medical services by telemedicine is:  (1) informed of the relationship between the practitioner and patient and the respective role of any other health care provider with respect to management of the patient; and (2) notified that he or she may decline to receive medical services by telemedicine and may withdraw from such care at any time.    Chief Complaint:  Kaci Handley is a 33 y.o. female who presents today for follow-up.  Met with patient.      HISTORY OF PRESENTING ILLNESS:  Kaci Handley is a 33 y.o. female with history of unspecified depressive disorder and unspecified anxiety disorder who presents for follow up appointment.      Plan at last appointment:   Continue duloxetine 60 mg daily for anxiety and depression  Start bupropion  mg q.a.m. for mood  Continue individual psychotherapy with SORAYA Mancini LCSW    Psychotropic medication history:   Prozac (emotional blunting)  Trintellix (ineffective)  Lamictal (rash)  Trazodone (effective but drowsiness in AM)  Olanzapine 5 mg daily (sedation, fatigue, wt gain)      INTERVAL HISTORY:    Bupropion  mg daily was started 6 weeks ago.  Today, Pt reports her mood has improved somewhat. "The medicine is working, but my situation hasn't changed. I have so much stress going on right now." She continues to struggle with family dynamics including substance abuse by her mother and younger sister as well as difficulty with her mother-in-law.  Would like to make an appointment for therapy, but hasn't been able to d/t her schedule.    3/26/24 AIMS = 0  7/23 AIMS =1    No interval episodes with symptoms " "consistent with je or hypomania.  Denied interval or current suicidal/homicidal thoughts, intent, or plan or NSSI.  Denied other questions and concerns.    Medication side effects: see above  Medication adherence: yes    PSYCHIATRIC REVIEW OF SYSTEMS:  Is patient experiencing or having changes in:  Mood: "overwhelmed"  Anhedonia: no  Guilt: no  Sleep: sleeping better, occasional awakenings around 0200, does feel well rested upon awakening in the AM  Energy: improving  Concentration: no  Appetite: "normal" has lost 27 lbs since d/c olanzapine; Cravings for high sugar foods have resolved  Psychomotor: no  SI: no     Anxiety: continued  Worry: no  Panic attacks: no  Restlessness/'on edge': no  Irritability: no  Muscle tension: no  Avoidance: no  Agoraphobia: no  Social phobia: no  Recurrent nightmares: no  Hyper startle response: no   Dissociative episodes: no  Recurrent thoughts: no  Recurrent behaviors: no     Distractibility: no  Indiscretion: no  Grandiosity: no   Racing thoughts/Flight of ideas: no  Increased activity: no  Reduced need for sleep: no  Talkativeness/Pressured speech: no      A/V hallucinations: no  Delusions: no  Paranoia: no    MEDICAL REVIEW OF SYSTEMS:   Pain: Denies any significant chronic or acute pain.  Constitutional: Denies fever or change in appetite.  Cardiovascular: Denies chest pain or exertional dyspnea.  Respiratory: Denies cough or orthopnea.   GI: Denies abdominal pain, N/V  Neurological: Denies tremor, seizure, or focal weakness.  Psychiatric: See HPI above.    PAST PSYCHIATRIC, MEDICAL, AND SOCIAL HISTORY REVIEWED  The patient's past medical, family and social history have been reviewed and updated as appropriate within the electronic medical record - see encounter notes.    PAST MEDICAL HISTORY:   Past Medical History:   Diagnosis Date    Abdominal pain, RLQ (right lower quadrant) 03/04/2013    Abnormal Pap smear of vagina     Allergy     Anemia     History of 2019 novel " coronavirus disease (COVID-19) 12/08/2020    Hyperglycemia     Pituitary adenoma     Thyroid disease     thyroid level fluctuate    Well female exam with routine gynecological exam 11/21/2012    Wish to become pregnant in the immediate future[if female of childbearing age]: no     NEUROLOGIC HISTORY:  Seizures:  once in 2014, has not had any since   Head trauma:  denies    PAST PSYCHIATRIC HISTORY:  Psychiatric Care (current & past):  psychiatrist at age 14  Previous Psychiatric Diagnoses:  MDD, TATA, bipolar disorder  Previous Psychiatric Hospitalizations: denies  Previous SI/HI:   denies  Previous Suicide Attempts or NSSI: denies  History of Psychotherapy: therapy at age 14  History of Violence: denies    FAMILY HISTORY:   Paternal: unknown, Pt only rarely speaks to father, he uses drugs  Maternal: mother bipolar and heavy drug user    SOCIAL HISTORY:   Developmental/Childhood: born and raised in [place], moved after [place] to [facility], then [facility], lived with grandmother at age 19  Marital Status/Relationship Status:  3 years, has been and will graduate with master's degree in history in approximately 6 months  Children: denies  Resides/Housing Status: [place] in her 's parent's house with her , her 's parents, her 's sister, and her 's sister's daughter  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  Hobbies/Recreational Activities: gym to workout  Spirituality/Denominational: Pentecostal, practicing  Education level: HS graduate, MA certification   History: 8 months in [employer], was d/c with an injury  Legal History: denies  Access to firearms: yes, some are kept in a safe, Pt has one in her vehicle for self defense     SUBSTANCE USE HISTORY:  Caffeine: 1-2 cups coffee in AM  Tobacco: denies  Alcohol:  very rarely, maybe like once per year, hemangioma on liver  Other Substances: denies  Rehab: denies  Detoxes:  denies    MEDICATIONS:    Current  Outpatient Medications:     buPROPion (WELLBUTRIN XL) 300 MG 24 hr tablet, Take 1 tablet (300 mg total) by mouth once daily., Disp: 90 tablet, Rfl: 0    DULoxetine (CYMBALTA) 60 MG capsule, Take 1 capsule (60 mg total) by mouth once daily., Disp: 90 capsule, Rfl: 1    dupilumab (DUPIXENT PEN) 300 mg/2 mL PnIj, Inject 300 mg into the skin every 14 (fourteen) days., Disp: , Rfl:     fexofenadine (ALLEGRA) 180 MG tablet, Take 180 mg by mouth once daily., Disp: , Rfl:     fremanezumab-vfrm (AJOVY AUTOINJECTOR) 225 mg/1.5 mL autoinjector, Inject 1.5 mLs (225 mg total) into the skin every 28 days., Disp: 1 each, Rfl: 11    LINZESS 72 mcg Cap capsule, Take 72 mcg by mouth every morning., Disp: , Rfl:     montelukast (SINGULAIR) 10 mg tablet, TAKE 1 TABLET BY MOUTH ONCE DAILY IN THE EVENING, Disp: 90 tablet, Rfl: 3    ondansetron (ZOFRAN-ODT) 8 MG TbDL, Take 1 tablet (8 mg total) by mouth 3 (three) times daily as needed (nauea or vomiting)., Disp: 30 tablet, Rfl: 2    rizatriptan (MAXALT) 10 MG tablet, Take 1 tablet (10 mg total) by mouth as needed for Migraine (take at onset of migraine,). May repeat in 2 hrs x 2, Disp: 30 tablet, Rfl: 2    spironolactone (ALDACTONE) 100 MG tablet, Take 100 mg by mouth once daily., Disp: , Rfl:     tiZANidine (ZANAFLEX) 4 MG tablet, Take One-Half or full tablet by mouth at night as needed for muscle spasm, Disp: 30 tablet, Rfl: 11    ubrogepant (UBRELVY) 100 mg tablet, Take 1 tablet by mouth as needed for migraine. May repeat in 2 hours if needed. Max 2 tabs per day, Disp: 16 tablet, Rfl: 11    ALLERGIES:  Review of patient's allergies indicates:   Allergen Reactions    Covid-19 vac, bv (moderna)(pf) Other (See Comments)     Nausea, fever , myalgia       EXAM:  Constitutional  Vitals:  Most recent vital signs were reviewed.   Last 3 sets of VS:      2/6/2024     8:00 AM 2/16/2024     3:31 PM 3/26/2024    11:59 AM   Vitals - 1 value per visit   SYSTOLIC 108  105   DIASTOLIC 76  74   Pulse  "88  81   Resp 18     Weight (lb) 124.78  126.1   Weight (kg) 56.6  57.2   Height 5' 2" (1.575 m)  5' 2" (1.575 m)   BMI (Calculated) 22.8  23.1   Pain Score Zero Zero Zero      General:  unremarkable, age appropriate     Musculoskeletal  Muscle Strength/Tone:  No tremors appreciated   Gait & Station:  Unable to assess, Pt seated during virtual visit     Psychiatric  Speech:  no latency; no press   Mood & Affect:  anxious, sad  congruent and appropriate   Thought Process:  normal and logical   Associations:  intact   Thought Content:  normal, no suicidality, no homicidality, delusions, or paranoia   Insight:  intact   Judgement: behavior is adequate to circumstances   Orientation:  grossly intact   Memory: intact for content of interview   Language: grossly intact   Attention Span & Concentration:  Intact to interview   Fund of Knowledge:  Not tested     SUICIDE RISK ASSESSMENT:  Protective factors: age, gender, no prior attempts, no prior hospitalizations, no family h/o attempts, no ongoing substance abuse, no psychosis, , has children, denies SI/intent/plan, seeking treatment, access to treatment, future oriented  Risks:  Access to firearms, hx depression and anxiety  Patient is a low immediate and long-term risk considering risk factors.        RELEVANT LABS/STUDIES:    Lab Results   Component Value Date    WBC 18.09 (H) 01/05/2024    HGB 14.5 01/05/2024    HCT 42.6 01/05/2024    MCV 89 01/05/2024     01/05/2024     BMP  Lab Results   Component Value Date     01/05/2024    K 3.9 01/05/2024     01/05/2024    CO2 24 01/05/2024    BUN 19 (H) 01/05/2024    CREATININE 0.65 01/05/2024    CALCIUM 9.8 01/05/2024    ANIONGAP 11 01/05/2024    ESTGFRAFRICA >60 06/18/2022    EGFRNONAA >60 06/18/2022     Lab Results   Component Value Date    ALT 21 01/05/2024    AST 26 01/05/2024    ALKPHOS 93 01/05/2024    BILITOT 0.4 01/05/2024     Lab Results   Component Value Date    TSH 1.400 12/15/2023     Lab " Results   Component Value Date    HGBA1C 4.9 03/07/2016     Lab Results   Component Value Date    CHOL 170 12/15/2023    TRIG 36 12/15/2023    HDL 56 12/15/2023    LDLCALC 106.8 12/15/2023    CHOLHDL 32.9 12/15/2023    TOTALCHOLEST 3.0 12/15/2023       IMPRESSION:    Kaci Handley is a 33 y.o. female with history of unspecified depressive disorder and unspecified anxiety disorder who presents for follow up appointment.    Status/Progress: Based on the examination today, the patient's problem(s) is/are adequately but not ideally controlled.  New problems have not been presented today.   Co-morbidities are not complicating management of the primary condition.  There are no active rule-out diagnoses for this patient at this time.     Risk Parameters:  Patient reports no suicidal ideation  Patient reports no homicidal ideation  Patient reports no self-injurious behavior  Patient reports no violent behavior    DIAGNOSES:    ICD-10-CM ICD-9-CM   1. Moderate episode of recurrent major depressive disorder  F33.1 296.32   2. Anxiety disorder, unspecified type  F41.9 300.00       PLAN:  Continue duloxetine 60 mg daily for anxiety and depression  Increase bupropion XL from 150 to 300 mg q.a.m. for mood  Continue individual psychotherapy with SORAYA Mancini LCSW    RETURN TO CLINIC:   6 weeks or sooner p.r.n.       HESHAM Keane, PMP-BC    34 minutes of total time spent on the encounter, which includes face to face time and non-face to face time preparing to see the patient (eg. review of tests), obtaining and/or reviewing separately obtained history, documenting clinical information in the electronic health record, independently interpreting results (not separately reported), and communicating results to the patient/family/caregiver, or care coordination (not separately reported).     Visit today included managing the longitudinal care of the patient due to the serious and/or complex managed problem(s) MDD  "and unspecified anxiety disorder.    At this time there are no indications the patient represents an imminent danger to either themselves or others; will continue to manage treatment in the outpatient setting.    I discussed the patient's care with the patient including benefits, alternatives, possible adverse effects of the treatment plan; including the potential for metabolic complications, major organ dysfunction, black box warnings, and contraindications. The opportunity was given for questions/clarification, and after this discussion the above treatment plan was devised through shared decision making. The patient voiced their understanding of the diagnoses and treatments listed above and agreed to the treatment plan. Follow up plan was reviewed with the patient. The patient was advised to call to report any worsening of symptoms or problems with medication.    Supportive therapy and psychoeducation provided. Patient has been given crisis information including Suicide and Crisis Lifeline (call or text: 891). Patient also given instructions to go to the nearest ER or call 911 if unable to remain safe or if the Pt develops thoughts of harming self or others.    Documentation entered by me for this encounter may have been done in part using [employer] Direct voice recognition transcription software. Garbled syntax, mangled pronouns, and other bizarre constructions may be attributed to that software system. Although I have made an effort to ensure accuracy, "sound like" errors may exist and should be interpreted in context.  "

## 2024-05-29 ENCOUNTER — PATIENT MESSAGE (OUTPATIENT)
Dept: NEUROLOGY | Facility: CLINIC | Age: 34
End: 2024-05-29

## 2024-05-29 ENCOUNTER — OFFICE VISIT (OUTPATIENT)
Dept: NEUROLOGY | Facility: CLINIC | Age: 34
End: 2024-05-29
Payer: COMMERCIAL

## 2024-05-29 VITALS
HEIGHT: 62 IN | SYSTOLIC BLOOD PRESSURE: 102 MMHG | DIASTOLIC BLOOD PRESSURE: 71 MMHG | RESPIRATION RATE: 17 BRPM | WEIGHT: 126.44 LBS | HEART RATE: 74 BPM | BODY MASS INDEX: 23.27 KG/M2 | TEMPERATURE: 98 F

## 2024-05-29 DIAGNOSIS — G44.40 MEDICATION OVERUSE HEADACHE: ICD-10-CM

## 2024-05-29 DIAGNOSIS — G43.019 INTRACTABLE MIGRAINE WITHOUT AURA AND WITHOUT STATUS MIGRAINOSUS: Primary | ICD-10-CM

## 2024-05-29 PROCEDURE — 1159F MED LIST DOCD IN RCRD: CPT | Mod: CPTII,S$GLB,, | Performed by: NURSE PRACTITIONER

## 2024-05-29 PROCEDURE — 3008F BODY MASS INDEX DOCD: CPT | Mod: CPTII,S$GLB,, | Performed by: NURSE PRACTITIONER

## 2024-05-29 PROCEDURE — 99213 OFFICE O/P EST LOW 20 MIN: CPT | Mod: S$GLB,,, | Performed by: NURSE PRACTITIONER

## 2024-05-29 PROCEDURE — 1160F RVW MEDS BY RX/DR IN RCRD: CPT | Mod: CPTII,S$GLB,, | Performed by: NURSE PRACTITIONER

## 2024-05-29 PROCEDURE — 99999 PR PBB SHADOW E&M-EST. PATIENT-LVL IV: CPT | Mod: PBBFAC,,, | Performed by: NURSE PRACTITIONER

## 2024-05-29 PROCEDURE — 3078F DIAST BP <80 MM HG: CPT | Mod: CPTII,S$GLB,, | Performed by: NURSE PRACTITIONER

## 2024-05-29 PROCEDURE — 3074F SYST BP LT 130 MM HG: CPT | Mod: CPTII,S$GLB,, | Performed by: NURSE PRACTITIONER

## 2024-05-29 NOTE — PROGRESS NOTES
Date of service: 5/29/2024  Referring provider: No ref. provider found    Subjective:      Chief complaint: Headache       Patient ID: Kaci Handley is a 33 y.o. female with anxiety, chronic fatigue, hemangioma of liver, insomnia, pituitary adenoma, depression who presents for follow up of headache     History of Present Illness    INTERVAL HISTORY 5/29/24    Last visit was about three months ago and at that time I referred for PT and sleep study. We started Avjoy and Ubrelvy.    Today she reports she is better. Current pain 0 with range 0-8. She has 1-2 headache days per week. She takes Ubrelvy 1-2 days per week. She stopped all OTC medications. No side effects to Ajovy. Otherwise information below is reviewed and verified with no changes made     ORIGINAL HEADACHE HISTORY - 2/6/24  Age at onset and course over time: 2009 began with near daily afternoon headaches. She would have to lay down and sleep for them to resolve. Headaches went away for a couple years. Then came back 3-4 years ago.  Family history of headaches - none  Last eye exam - 1 year ago  Location: forehead, neck, temples   Quality:  [] pressure [] tight [x] throbbing [] sharp [] stabbing   Severity: current 1 with range 1-8  Duration:  hours  Frequency: 3 days per week  Headaches awaken at night?: yes    Worst time of day:  mid-day, evening   Associated with: [x] photophobia [x]  phonophobia [x] osmophobia [] blurred vision  [] double vision [x] loss of appetite [x] nausea [x] vomiting [x] dizziness [] vertigo  [x] tinnitus [x] irritability [x] sinus pressure [] problems with concentration   [x] neck tightness   Alleviated by:  [] sleep [x] darkness [x] massage [] heat [] ice [] medication  Exacerbated by:  [x] fatigue [] light [] noise [x] smells [] coughing [] sneezing  [] bending over [] ovulation [x] menses [] alcohol [x] change in weather [x]  stress  Ipsilateral autonomic: [] nasal congestion [] lacrimation [] ptosis []  injection [] edema [] foreign body sensation [] ear fullness   ICP:  [] transient visual obscurations  [x] tinnitus low pitched, bilateral   [] positional headache  [x] non-positional   Sleep habits: trouble staying asleep, unrefreshing sleep, snoring   Caffeine intake:  mg coffee in AM  Gyn status (if female): having periods, IUD  HIT 6: 65    Current acute treatment:  Zofran  Rizatriptan - 3-4 days per week  Ubrelvy    Current prevention:  Cymbalta  Ajovy - first February 204    Previously tried/failed acute treatment:  Tramadol  Naproxen  Flexeril  Sumatriptan  Nurtec - samples, sedating  Tylenol  ASA  Fioricet  Excedrin  Tylenol XS - daily   Ibuprofen - near daily for about 10 months     Previously tried/failed preventative treatment:  Zyprexa  Fluoxetine  Trazodone  Topamax - 6-8 months with initial improvement then worsened   Amitriptyline - 6-8 months with initial improvement then worsened       Review of patient's allergies indicates:   Allergen Reactions    Covid-19 vac, bv (moderna)(pf) Other (See Comments)     Nausea, fever , myalgia     Current Outpatient Medications   Medication Sig Dispense Refill    buPROPion (WELLBUTRIN XL) 300 MG 24 hr tablet Take 1 tablet (300 mg total) by mouth once daily. 90 tablet 0    DULoxetine (CYMBALTA) 60 MG capsule Take 1 capsule (60 mg total) by mouth once daily. 90 capsule 1    dupilumab (DUPIXENT PEN) 300 mg/2 mL PnIj Inject 300 mg into the skin every 14 (fourteen) days.      fexofenadine (ALLEGRA) 180 MG tablet Take 180 mg by mouth once daily.      fremanezumab-vfrm (AJOVY AUTOINJECTOR) 225 mg/1.5 mL autoinjector Inject 1.5 mLs (225 mg total) into the skin every 28 days. 1 each 11    montelukast (SINGULAIR) 10 mg tablet TAKE 1 TABLET BY MOUTH ONCE DAILY IN THE EVENING 90 tablet 3    ondansetron (ZOFRAN-ODT) 8 MG TbDL Take 1 tablet (8 mg total) by mouth 3 (three) times daily as needed (nauea or vomiting). 30 tablet 2    spironolactone (ALDACTONE) 100 MG tablet  Take 100 mg by mouth once daily.      ubrogepant (UBRELVY) 100 mg tablet Take 1 tablet by mouth as needed for migraine. May repeat in 2 hours if needed. Max 2 tabs per day 16 tablet 11     No current facility-administered medications for this visit.       Past Medical History  Past Medical History:   Diagnosis Date    Abdominal pain, RLQ (right lower quadrant) 03/04/2013    Abnormal Pap smear of vagina     Allergy     Anemia     Headache     History of 2019 novel coronavirus disease (COVID-19) 12/08/2020    Hyperglycemia     Pituitary adenoma     Thyroid disease     thyroid level fluctuate    Well female exam with routine gynecological exam 11/21/2012       Past Surgical History  Past Surgical History:   Procedure Laterality Date    COLONOSCOPY  06/30/2017    F Rabito    CYST REMOVAL  2013    left wrist-benign    ENDOSCOPIC NASAL SEPTOPLASTY Bilateral 12/28/2018    Procedure: SEPTOPLASTY, NOSE, ENDOSCOPIC;  Surgeon: Sam Robin MD;  Location: [facility];  Service: ENT;  Laterality: Bilateral;    ENDOSCOPIC NASAL SEPTOPLASTY  08/05/2020    FUNCTIONAL ENDOSCOPIC SINUS SURGERY (FESS) USING COMPUTER-ASSISTED NAVIGATION Bilateral 12/28/2018    Procedure: FESS, USING COMPUTER-ASSISTED NAVIGATION;  Surgeon: Sam Robin MD;  Location: [facility];  Service: ENT;  Laterality: Bilateral;    NASAL RECONSTRUCTION  08/05/2020    with graft    NASAL TURBINATE REDUCTION Bilateral 12/28/2018    Procedure: REDUCTION, NASAL TURBINATE;  Surgeon: Sam Robin MD;  Location: [facility];  Service: ENT;  Laterality: Bilateral;    NASAL TURBINATE REDUCTION  08/05/2020    POLYPECTOMY Left 2/28/2023    Procedure: POLYPECTOMY/ Nasal;  Surgeon: Eduardo Rivas MD;  Location: [facility];  Service: ENT;  Laterality: Left;    TONSILLECTOMY  1998    WISDOM TOOTH EXTRACTION  2009       Family History  Family History   Problem Relation Name Age of Onset    ADD / ADHD Mother      Bipolar disorder Mother      Allergies Mother      Drug abuse  Mother      Retinal detachment Mother      Crohn's disease Father      Drug abuse Father      Alcohol abuse Father      No Known Problems Sister      Breast cancer Maternal Grandmother      Cancer Maternal Grandmother      Diabetes Maternal Grandfather      Diabetes Paternal Grandmother      COPD Paternal Grandfather      Ovarian cancer Neg Hx      Glaucoma Neg Hx      Macular degeneration Neg Hx         Social History  Social History     Socioeconomic History    Marital status:      Spouse name: Dallas Handley    Number of children: 0   Occupational History    Occupation: medical assistant     Employer: OTHER     Comment: [employer]   Tobacco Use    Smoking status: Former     Current packs/day: 0.00     Average packs/day: 0.3 packs/day for 3.0 years (0.8 ttl pk-yrs)     Types: Cigarettes     Start date: 10/21/2015     Quit date: 10/21/2018     Years since quittin.6    Smokeless tobacco: Never   Substance and Sexual Activity    Alcohol use: No    Drug use: Never    Sexual activity: Yes     Partners: Male     Birth control/protection: Condom, I.U.D.     Social Determinants of Health     Financial Resource Strain: Low Risk  (2024)    Overall Financial Resource Strain (CARDIA)     Difficulty of Paying Living Expenses: Not very hard   Food Insecurity: No Food Insecurity (2024)    Hunger Vital Sign     Worried About Running Out of Food in the Last Year: Never true     Ran Out of Food in the Last Year: Never true   Transportation Needs: No Transportation Needs (2024)    PRAPARE - Transportation     Lack of Transportation (Medical): No     Lack of Transportation (Non-Medical): No   Physical Activity: Sufficiently Active (2024)    Exercise Vital Sign     Days of Exercise per Week: 5 days     Minutes of Exercise per Session: 60 min   Stress: Stress Concern Present (2024)    [employer] [place] [employer] - Occupational Stress Questionnaire     Feeling of Stress : To some extent    Housing Stability: Low Risk  (1/24/2024)    Housing Stability Vital Sign     Unable to Pay for Housing in the Last Year: No     Number of Places Lived in the Last Year: 1     Unstable Housing in the Last Year: No        Objective:        Vitals:    05/29/24 1538   BP: 102/71   Pulse: 74   Resp: 17   Temp: 97.8 °F (36.6 °C)       Body mass index is 23.13 kg/m².    5/29/24  Constitutional:   She appears well-developed and well-nourished. She is well groomed     Neurological Exam:  General: well-developed, well-nourished, no distress  Mental status: Awake and alert  Speech language: No dysarthria or aphasia on conversation  Cranial nerves: Face symmetric  Motor: Moves all extremities well  Coordination: No ataxia. No tremor.    2/6/24  Constitutional: appears in no acute distress, well-developed, well-nourished     Eyes: normal conjunctiva, PERRLA    Ears, nose, mouth, throat: external appearance of ears and nose normal, hearing intact. Tongue scalloping      Cardiovascular: regular rate and rhythm, no murmurs appreciated    Respiratory: unlabored respirations, breath sounds normal bilaterally    Gastrointestinal: no visible abdominal masses, no guarding, no visible hernia    Musculoskeletal: normal tone in all four extremities. No abnormal movements. No pronator drift. No orbit. Symmetric finger tapping. Normal station. Normal regular gait. Normal tandem gait.      Spine:   CERVICAL SPINE:  ROM: normal   MUSCLE SPASM: mild, in all planes    FACET LOADING: no   SPURLING: no  RENE / LAURI tender: no     Psychiatric: normal judgment and insight. Oriented to person, place, and time.     Neurologic:   Cortical functions: recent and remote memory intact, normal attention span and concentration, speech fluent, adequate fund of knowledge   Cranial nerves: visual fields full, PERRLA, EOMI, symmetric facial strength, hearing intact, palate elevates symmetrically, shoulder shrug 5/5, tongue protrudes midline   Reflexes: 2+ in the  upper and lower extremities, no Mccormack  Sensation: intact to temperature throughout   Coordination: normal finger to nose, heel to shin, tandem gait     Data Review:     I have personally reviewed the referring provider's notes, labs, & imaging made available to me today.      RADIOLOGY STUDIES:  I have personally reviewed the pertinent images performed.       Results for orders placed or performed during the hospital encounter of 01/05/24   CT Head Without Contrast    Narrative    EXAMINATION:  CT HEAD WITHOUT CONTRAST    CLINICAL HISTORY:  Head trauma, moderate-severe;    TECHNIQUE:  Low dose axial CT images obtained throughout the head without intravenous contrast. Sagittal and coronal reconstructions were performed.    COMPARISON:  MRI 01/30/2021; CT 01/25/2021    FINDINGS:  Intracranial compartment:    Ventricles and sulci are normal in size for age without evidence of hydrocephalus. No extra-axial blood or fluid collections.    No acute parenchymal hemorrhage or evolving major vascular distribution infarct.  No intracranial mass effect or midline shift.    Skull/extracranial contents (limited evaluation): No fracture.  Partially visualized mucosal thickening in the right maxillary antrum.  Mastoid air cells and remaining paranasal sinuses are essentially clear.      Impression    No acute intracranial abnormality.      Electronically signed by: Domo Belcher  Date:    01/05/2024  Time:    20:35       Lab Results   Component Value Date     01/05/2024    K 3.9 01/05/2024     01/05/2024    CO2 24 01/05/2024    BUN 19 (H) 01/05/2024    CREATININE 0.65 01/05/2024    GLU 97 01/05/2024    HGBA1C 4.9 03/07/2016    AST 26 01/05/2024    AST 16 03/07/2016    ALT 21 01/05/2024    ALBUMIN 5.0 01/05/2024    PROT 8.9 (H) 01/05/2024    BILITOT 0.4 01/05/2024    CHOL 170 12/15/2023    HDL 56 12/15/2023    LDLCALC 106.8 12/15/2023    TRIG 36 12/15/2023       Lab Results   Component Value Date    WBC 18.09 (H)  01/05/2024    HGB 14.5 01/05/2024    HCT 42.6 01/05/2024    MCV 89 01/05/2024     01/05/2024       Lab Results   Component Value Date    TSH 1.400 12/15/2023           Assessment & Plan:       Problem List Items Addressed This Visit          Neuro    Intractable migraine without aura and without status migrainosus - Primary    Overview     Migraine headaches since 2009. Headaches are typically unilateral, moderate to severe in intensity, worsen with activity, pounding in quality and associated with sensitivity to light, smell and sound. Gradual progression pattern, lack of red flag features on history, and normal neurological exam are reassuring for primary as opposed to secondary etiology of headaches thus imaging will not be pursued for this history and this exam at this time.    We discussed options for prevention. Previously failed Topamax and amitriptyline. Add CGRP. Avoid Aimovig and Qulipta due to baseline IBS with constipation. Start with Ajovy.    For acute attacks trial Ubrelvy. Discussed need to stop OTC.           Current Assessment & Plan     She has been on Ajovy for three months with at least 50% reduction in headache days. Ubrelvy is effective. Continue current plan.           Other Visit Diagnoses       Medication overuse headache        Resolved. She has stopped all OTC medication use.                  Please call our clinic at [phone] or send a message on the [facility] portal if there are any changes to the plan described below, for example,if you are not contacted for the requested tests, referral(s) within one week, if you are unable to receive the medications prescribed, or if you feel you need to change the treatment course for any reason.     TESTING:  -- none     REFERRALS:  -- none     PREVENTION (use daily regardless of headache):  -- continue Ajovy once monthly  -- continue magnesium in ONE of the following preparations -               1. Magnesium oxide 800mg daily (the most  common over the counter kind, may causes loose stools)              2. Magnesium citrate 400-500mg daily (harder to find, but more neutral on the bowels)              3. Magnesium glycinate 400mg daily (hardest to find, look online, but most bowel-neutral, best absorbed)     AS-NEEDED TREATMENT (use total no more than 10 days per month unless otherwise stated):  -- continue Ubrelvy with next migraine. You can repeat two hours later if needed. With this medication do not drink grapefruit juice or eat grapefruit or some medications like ketoconazole, itraconazole, or antibiotics clarithromycin  -- continue tizanidine at night. This is a muscle relaxer and it will also make you potentially sleepy. Start with half a tablet to see how you respond but can take up to a whole tablet if needed      Follow up in about 4 months (around 9/29/2024).    Maribel Wick NP

## 2024-05-29 NOTE — ASSESSMENT & PLAN NOTE
She has been on Ajovy for three months with at least 50% reduction in headache days. Ubrelvy is effective. Continue current plan.

## 2024-05-29 NOTE — PATIENT INSTRUCTIONS
Please call our clinic at [phone] or send a message on the [facility] portal if there are any changes to the plan described below, for example,if you are not contacted for the requested tests, referral(s) within one week, if you are unable to receive the medications prescribed, or if you feel you need to change the treatment course for any reason.     TESTING:  -- none     REFERRALS:  -- none     PREVENTION (use daily regardless of headache):  -- continue Ajovy once monthly  -- continue magnesium in ONE of the following preparations -               1. Magnesium oxide 800mg daily (the most common over the counter kind, may causes loose stools)              2. Magnesium citrate 400-500mg daily (harder to find, but more neutral on the bowels)              3. Magnesium glycinate 400mg daily (hardest to find, look online, but most bowel-neutral, best absorbed)     AS-NEEDED TREATMENT (use total no more than 10 days per month unless otherwise stated):  -- continue Ubrelvy with next migraine. You can repeat two hours later if needed. With this medication do not drink grapefruit juice or eat grapefruit or some medications like ketoconazole, itraconazole, or antibiotics clarithromycin  -- continue tizanidine at night. This is a muscle relaxer and it will also make you potentially sleepy. Start with half a tablet to see how you respond but can take up to a whole tablet if needed

## 2024-07-02 ENCOUNTER — OFFICE VISIT (OUTPATIENT)
Dept: PSYCHIATRY | Facility: CLINIC | Age: 34
End: 2024-07-02
Payer: COMMERCIAL

## 2024-07-02 DIAGNOSIS — F41.9 ANXIETY DISORDER, UNSPECIFIED TYPE: ICD-10-CM

## 2024-07-02 DIAGNOSIS — F33.42 RECURRENT MAJOR DEPRESSIVE DISORDER, IN FULL REMISSION: Primary | ICD-10-CM

## 2024-07-02 PROCEDURE — 99214 OFFICE O/P EST MOD 30 MIN: CPT | Mod: 95,,,

## 2024-07-02 PROCEDURE — G2211 COMPLEX E/M VISIT ADD ON: HCPCS | Mod: 95,,,

## 2024-07-02 PROCEDURE — 1159F MED LIST DOCD IN RCRD: CPT | Mod: CPTII,95,,

## 2024-07-02 PROCEDURE — 1160F RVW MEDS BY RX/DR IN RCRD: CPT | Mod: CPTII,95,,

## 2024-07-02 NOTE — PROGRESS NOTES
"OUTPATIENT PSYCHIATRY FOLLOW UP VISIT    Encounter Date: 7/2/2024    Clinical Status of Patient:  Outpatient (Virtual)  The patient location is: [address] [place] [address]  The patient phone number is: [phone]   Visit type: Virtual visit with synchronous audio and video  Each patient to whom he or she provides medical services by telemedicine is:  (1) informed of the relationship between the practitioner and patient and the respective role of any other health care provider with respect to management of the patient; and (2) notified that he or she may decline to receive medical services by telemedicine and may withdraw from such care at any time.    Chief Complaint:  Kaci Handley is a 33 y.o. female who presents today for follow-up.  Met with patient.      HISTORY OF PRESENTING ILLNESS:  Kaci Handley is a 33 y.o. female with history of MDD and unspecified anxiety disorder who presents for follow up appointment.      5/6/2024: Bupropion  mg daily was started 6 weeks ago.  Today, Pt reports her mood has improved somewhat. "The medicine is working, but my situation hasn't changed. I have so much stress going on right now." She continues to struggle with family dynamics including substance abuse by her mother and younger sister as well as difficulty with her mother-in-law.  Would like to make an appointment for therapy, but hasn't been able to d/t her schedule.    Plan at last appointment:   Continue duloxetine 60 mg daily for anxiety and depression  Increase bupropion XL from 150 to 300 mg q.a.m. for mood  Continue individual psychotherapy with SORAYA Mancini LCSW    Psychotropic medication history:   Prozac (emotional blunting)  Trintellix (ineffective)  Lamictal (rash)  Trazodone (effective but drowsiness in AM)  Olanzapine 5 mg daily (sedation, fatigue, wt gain)      INTERVAL HISTORY:    Bupropion XL was increased from 150-300 mg q.a.m. at last visit for mood. " "Today, Pt reports her mood has been "Much better." She states she is able to better handle the stress with her mother-in-law. She also notes a decrease in overall stress. She states her relationship with her  has improved.      3/26/24 AIMS = 0  7/23 AIMS =1    No interval episodes with symptoms consistent with je or hypomania.  Denied interval or current suicidal/homicidal thoughts, intent, or plan or NSSI.  Denied other questions and concerns.  Patient reports feeling stable and wishing to continue current management unchanged.    Medication side effects: see above  Medication adherence: yes    Is patient experiencing or having changes in:  Anhedonia: no  Guilt: no  Sleep: sleeping well, does feel well rested upon awakening in the AM  Energy: improving  Concentration: no  Appetite: "normal" has lost 27 lbs since d/c olanzapine; Cravings for high sugar foods have resolved  Psychomotor: no  SI: no     Anxiety: continued  Worry: no  Panic attacks: no  Restlessness/'on edge': no  Irritability: no  Muscle tension: no  Avoidance: no  Agoraphobia: no  Social phobia: no  Recurrent nightmares: no  Hyper startle response: no   Dissociative episodes: no  Recurrent thoughts: no  Recurrent behaviors: no     Distractibility: no  Indiscretion: no  Grandiosity: no   Racing thoughts/Flight of ideas: no  Increased activity: no  Reduced need for sleep: no  Talkativeness/Pressured speech: no      A/V hallucinations: no  Delusions: no  Paranoia: no    MEDICAL REVIEW OF SYSTEMS:   Pain: Denies any significant chronic or acute pain.  Constitutional: Denies fever or change in appetite.  Cardiovascular: Denies chest pain or exertional dyspnea.  Respiratory: Denies cough or orthopnea.   GI: Denies abdominal pain, N/V  Neurological: Denies tremor, seizure, or focal weakness.  Psychiatric: See HPI above.    PAST PSYCHIATRIC, MEDICAL, AND SOCIAL HISTORY REVIEWED  The patient's past medical, family and social history have been " reviewed and updated as appropriate within the electronic medical record - see encounter notes.    PAST MEDICAL HISTORY:   Past Medical History:   Diagnosis Date    Abdominal pain, RLQ (right lower quadrant) 03/04/2013    Abnormal Pap smear of vagina     Allergy     Anemia     Headache     History of 2019 novel coronavirus disease (COVID-19) 12/08/2020    Hyperglycemia     Pituitary adenoma     Thyroid disease     thyroid level fluctuate    Well female exam with routine gynecological exam 11/21/2012    Wish to become pregnant in the immediate future[if female of childbearing age]: no     NEUROLOGIC HISTORY:  Seizures:  once in 2014, has not had any since   Head trauma:  denies    PAST PSYCHIATRIC HISTORY:  Psychiatric Care (current & past):  psychiatrist at age 14  Previous Psychiatric Diagnoses:  MDD, TATA, bipolar disorder  Previous Psychiatric Hospitalizations: denies  Previous SI/HI:   denies  Previous Suicide Attempts or NSSI: denies  History of Psychotherapy: therapy at age 14  History of Violence: denies    FAMILY HISTORY:   Paternal: unknown, Pt only rarely speaks to father, he uses drugs  Maternal: mother bipolar and heavy drug user    SOCIAL HISTORY:   Developmental/Childhood: born and raised in [place], moved after [place] to 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  Hobbies/Recreational Activities: gym to workout  Spirituality/Mosque: Judaism, practicing  Education level: HS graduate, MA certification   History: 8 months in [employer], was d/c with an injury  Legal History: denies  Access to firearms:  yes, some are kept in a safe, Pt has one in her vehicle for self defense     SUBSTANCE USE HISTORY:  Caffeine: 1-2 cups coffee in AM  Tobacco: denies  Alcohol:  very rarely, maybe like once per year, hemangioma on liver  Other Substances: denies  Rehab: denies  Detoxes:  denies    MEDICATIONS:    Current Outpatient Medications:     buPROPion (WELLBUTRIN XL) 300 MG 24 hr tablet, Take 1 tablet (300 mg total) by mouth once daily., Disp: 90 tablet, Rfl: 0    DULoxetine (CYMBALTA) 60 MG capsule, Take 1 capsule (60 mg total) by mouth once daily., Disp: 90 capsule, Rfl: 1    dupilumab (DUPIXENT PEN) 300 mg/2 mL PnIj, Inject 300 mg into the skin every 14 (fourteen) days., Disp: , Rfl:     fexofenadine (ALLEGRA) 180 MG tablet, Take 180 mg by mouth once daily., Disp: , Rfl:     fremanezumab-vfrm (AJOVY AUTOINJECTOR) 225 mg/1.5 mL autoinjector, Inject 1.5 mLs (225 mg total) into the skin every 28 days., Disp: 1 each, Rfl: 11    MAGNESIUM GLYCINATE ORAL, Take by mouth., Disp: , Rfl:     montelukast (SINGULAIR) 10 mg tablet, TAKE 1 TABLET BY MOUTH ONCE DAILY IN THE EVENING, Disp: 90 tablet, Rfl: 3    ondansetron (ZOFRAN-ODT) 8 MG TbDL, Take 1 tablet (8 mg total) by mouth 3 (three) times daily as needed (nauea or vomiting)., Disp: 30 tablet, Rfl: 2    spironolactone (ALDACTONE) 100 MG tablet, Take 100 mg by mouth once daily., Disp: , Rfl:     ubrogepant (UBRELVY) 100 mg tablet, Take 1 tablet by mouth as needed for migraine. May repeat in 2 hours if needed. Max 2 tabs per day, Disp: 16 tablet, Rfl: 11    ALLERGIES:  Review of patient's allergies indicates:   Allergen Reactions    Covid-19 vac, bv (moderna)(pf) Other (See Comments)     Nausea, fever , myalgia       EXAM:  Constitutional  Vitals:  Most recent vital signs were reviewed.   Last 3 sets of VS:      2/16/2024     3:31 PM 3/26/2024    11:59 AM 5/29/2024     3:38 PM   Vitals - 1 value per visit   SYSTOLIC  105 102   DIASTOLIC  74 71   Pulse  81 74   Temp   97.8 °F (36.6  "°C)   Resp   17   Weight (lb)  126.1 126.43   Weight (kg)  57.2 57.35   Height  5' 2" (1.575 m) 5' 2" (1.575 m)   BMI (Calculated)  23.1 23.1   Pain Score Zero Zero Zero      General:  unremarkable, age appropriate     Musculoskeletal  Muscle Strength/Tone:  No tremors appreciated   Gait & Station:  Pt seated during virtual visit     Psychiatric  Speech:  no latency; no press   Mood & Affect:  euthymic  congruent and appropriate   Thought Process:  normal and logical   Associations:  intact   Thought Content:  normal, no suicidality, no homicidality, delusions, or paranoia   Insight:  intact   Judgement: behavior is adequate to circumstances   Orientation:  grossly intact   Memory: intact for content of interview   Language: grossly intact   Attention Span & Concentration:  Intact to interview   Fund of Knowledge:  Not tested     SUICIDE RISK ASSESSMENT:  Protective factors: age, gender, no prior attempts, no prior hospitalizations, no family h/o attempts, no ongoing substance abuse, no psychosis, , has children, denies SI/intent/plan, seeking treatment, access to treatment, future oriented  Risks:  Access to firearms, hx depression and anxiety  Patient is a low immediate and long-term risk considering risk factors.        RELEVANT LABS/STUDIES:    Lab Results   Component Value Date    WBC 8.80 06/28/2024    HGB 12.7 06/28/2024    HCT 40.3 06/28/2024    MCV 91 06/28/2024     06/28/2024     BMP  Lab Results   Component Value Date     06/28/2024    K 4.4 06/28/2024     06/28/2024    CO2 27 06/28/2024    BUN 18 06/28/2024    CREATININE 0.78 06/28/2024    CALCIUM 9.1 06/28/2024    ANIONGAP 6 06/28/2024    ESTGFRAFRICA >60 06/18/2022    EGFRNONAA >60 06/18/2022     Lab Results   Component Value Date    ALT 18 06/28/2024    AST 20 06/28/2024    ALKPHOS 72 06/28/2024    BILITOT 0.3 06/28/2024     Lab Results   Component Value Date    TSH 1.400 12/15/2023     Lab Results   Component Value Date    " HGBA1C 4.9 03/07/2016     Lab Results   Component Value Date    CHOL 170 12/15/2023    TRIG 36 12/15/2023    HDL 56 12/15/2023    LDLCALC 106.8 12/15/2023    CHOLHDL 32.9 12/15/2023    TOTALCHOLEST 3.0 12/15/2023       IMPRESSION:    Kaci Handley is a 33 y.o. female with history of MDD and unspecified anxiety disorder who presents for follow up appointment.    Status/Progress: Based on the examination today, the patient's problem(s) is/are improved and well controlled.  New problems have not been presented today.   Co-morbidities are not complicating management of the primary condition.  There are no active rule-out diagnoses for this patient at this time.     Risk Parameters:  Patient reports no suicidal ideation  Patient reports no homicidal ideation  Patient reports no self-injurious behavior  Patient reports no violent behavior    DIAGNOSES:    ICD-10-CM ICD-9-CM   1. Recurrent major depressive disorder, in full remission  F33.42 296.36   2. Anxiety disorder, unspecified type  F41.9 300.00       PLAN:  Continue duloxetine 60 mg daily for anxiety and depression  Continue bupropion  mg q.a.m. for mood  Recommended continuing individual psychotherapy with SORAYA Mancini LCSW, most recent visit 1/24/26    RETURN TO CLINIC:   6 weeks or sooner p.r.n.       HESHAM Keane, PMP-BC    34 minutes of total time spent on the encounter, which includes face to face time and non-face to face time preparing to see the patient (eg. review of tests), obtaining and/or reviewing separately obtained history, documenting clinical information in the electronic health record, independently interpreting results (not separately reported), and communicating results to the patient/family/caregiver, or care coordination (not separately reported).     Visit today included managing the longitudinal care of the patient due to the serious and/or complex managed problem(s) MDD and unspecified anxiety disorder.    At  "this time there are no indications the patient represents an imminent danger to either themselves or others; will continue to manage treatment in the outpatient setting.    I discussed the patient's care with the patient including benefits, alternatives, possible adverse effects of the treatment plan; including the potential for metabolic complications, major organ dysfunction, black box warnings, and contraindications. The opportunity was given for questions/clarification, and after this discussion the above treatment plan was devised through shared decision making. The patient voiced their understanding of the diagnoses and treatments listed above and agreed to the treatment plan. Follow up plan was reviewed with the patient. The patient was advised to call to report any worsening of symptoms or problems with medication.    Supportive therapy and psychoeducation provided. Patient has been given crisis information including Suicide and Crisis Lifeline (call or text: [phone]). Patient also given instructions to go to the nearest ER or call 911 if unable to remain safe or if the Pt develops thoughts of harming self or others.    Documentation entered by me for this encounter may have been done in part using [employer] Direct voice recognition transcription software. Garbled syntax, mangled pronouns, and other bizarre constructions may be attributed to that software system. Although I have made an effort to ensure accuracy, "sound like" errors may exist and should be interpreted in context.    "

## 2024-07-28 ENCOUNTER — PATIENT MESSAGE (OUTPATIENT)
Dept: PSYCHIATRY | Facility: CLINIC | Age: 34
End: 2024-07-28
Payer: COMMERCIAL

## 2024-08-30 DIAGNOSIS — F41.9 ANXIETY DISORDER, UNSPECIFIED TYPE: ICD-10-CM

## 2024-08-30 RX ORDER — DULOXETIN HYDROCHLORIDE 60 MG/1
60 CAPSULE, DELAYED RELEASE ORAL DAILY
Qty: 90 CAPSULE | Refills: 1 | Status: CANCELLED | OUTPATIENT
Start: 2024-08-30

## 2024-09-03 RX ORDER — DULOXETIN HYDROCHLORIDE 60 MG/1
60 CAPSULE, DELAYED RELEASE ORAL DAILY
Qty: 90 CAPSULE | Refills: 1 | Status: SHIPPED | OUTPATIENT
Start: 2024-09-03

## 2024-09-03 RX ORDER — BUPROPION HYDROCHLORIDE 300 MG/1
300 TABLET ORAL DAILY
Qty: 90 TABLET | Refills: 1 | Status: SHIPPED | OUTPATIENT
Start: 2024-09-03 | End: 2025-09-03

## 2024-09-30 ENCOUNTER — OFFICE VISIT (OUTPATIENT)
Dept: NEUROLOGY | Facility: CLINIC | Age: 34
End: 2024-09-30
Payer: COMMERCIAL

## 2024-09-30 DIAGNOSIS — G43.019 INTRACTABLE MIGRAINE WITHOUT AURA AND WITHOUT STATUS MIGRAINOSUS: Primary | ICD-10-CM

## 2024-09-30 PROCEDURE — 1159F MED LIST DOCD IN RCRD: CPT | Mod: CPTII,95,, | Performed by: NURSE PRACTITIONER

## 2024-09-30 PROCEDURE — 99213 OFFICE O/P EST LOW 20 MIN: CPT | Mod: 95,,, | Performed by: NURSE PRACTITIONER

## 2024-09-30 PROCEDURE — 1160F RVW MEDS BY RX/DR IN RCRD: CPT | Mod: CPTII,95,, | Performed by: NURSE PRACTITIONER

## 2024-09-30 NOTE — ASSESSMENT & PLAN NOTE
She has been on Ajovy for 6 months and gone from 3 headache days per week to less than 4 per month. Overall severity has improved by at least 50%. Ubrelvy is effective. Continue current plan.

## 2024-09-30 NOTE — PATIENT INSTRUCTIONS
Please call our clinic at [phone] or send a message on the [employer] portal if there are any changes to the plan described below, for example,if you are not contacted for the requested tests, referral(s) within one week, if you are unable to receive the medications prescribed, or if you feel you need to change the treatment course for any reason.     TESTING:  -- none     REFERRALS:  -- none     PREVENTION (use daily regardless of headache):  -- continue Ajovy once monthly  -- continue magnesium in ONE of the following preparations -               1. Magnesium oxide 800mg daily (the most common over the counter kind, may causes loose stools)              2. Magnesium citrate 400-500mg daily (harder to find, but more neutral on the bowels)              3. Magnesium glycinate 400mg daily (hardest to find, look online, but most bowel-neutral, best absorbed)     AS-NEEDED TREATMENT (use total no more than 10 days per month unless otherwise stated):  -- continue Ubrelvy with next migraine. You can repeat two hours later if needed. With this medication do not drink grapefruit juice or eat grapefruit or some medications like ketoconazole, itraconazole, or antibiotics clarithromycin  -- continue tizanidine at night. This is a muscle relaxer and it will also make you potentially sleepy. Start with half a tablet to see how you respond but can take up to a whole tablet if needed

## 2024-10-24 ENCOUNTER — PATIENT MESSAGE (OUTPATIENT)
Dept: OPTOMETRY | Facility: CLINIC | Age: 34
End: 2024-10-24
Payer: COMMERCIAL

## 2024-10-28 ENCOUNTER — TELEPHONE (OUTPATIENT)
Dept: OPTOMETRY | Facility: CLINIC | Age: 34
End: 2024-10-28
Payer: COMMERCIAL

## 2025-02-06 DIAGNOSIS — G43.019 INTRACTABLE MIGRAINE WITHOUT AURA AND WITHOUT STATUS MIGRAINOSUS: ICD-10-CM

## 2025-02-06 RX ORDER — FREMANEZUMAB-VFRM 225 MG/1.5ML
225 INJECTION SUBCUTANEOUS
Qty: 1 EACH | Refills: 11 | Status: SHIPPED | OUTPATIENT
Start: 2025-02-06

## 2025-02-25 PROBLEM — M67.431 GANGLION OF RIGHT WRIST: Status: ACTIVE | Noted: 2025-02-25

## 2025-03-13 DIAGNOSIS — G43.019 INTRACTABLE MIGRAINE WITHOUT AURA AND WITHOUT STATUS MIGRAINOSUS: ICD-10-CM

## 2025-03-14 RX ORDER — UBROGEPANT 100 MG/1
TABLET ORAL
Qty: 10 TABLET | Refills: 11 | Status: SHIPPED | OUTPATIENT
Start: 2025-03-14

## 2025-03-18 ENCOUNTER — OFFICE VISIT (OUTPATIENT)
Dept: NEUROLOGY | Facility: CLINIC | Age: 35
End: 2025-03-18
Payer: COMMERCIAL

## 2025-03-18 VITALS
TEMPERATURE: 98 F | DIASTOLIC BLOOD PRESSURE: 65 MMHG | HEART RATE: 75 BPM | RESPIRATION RATE: 17 BRPM | SYSTOLIC BLOOD PRESSURE: 104 MMHG | BODY MASS INDEX: 22.36 KG/M2 | HEIGHT: 62 IN | WEIGHT: 121.5 LBS

## 2025-03-18 DIAGNOSIS — G43.019 INTRACTABLE MIGRAINE WITHOUT AURA AND WITHOUT STATUS MIGRAINOSUS: Primary | ICD-10-CM

## 2025-03-18 PROCEDURE — 3078F DIAST BP <80 MM HG: CPT | Mod: CPTII,S$GLB,, | Performed by: NURSE PRACTITIONER

## 2025-03-18 PROCEDURE — 3008F BODY MASS INDEX DOCD: CPT | Mod: CPTII,S$GLB,, | Performed by: NURSE PRACTITIONER

## 2025-03-18 PROCEDURE — 99213 OFFICE O/P EST LOW 20 MIN: CPT | Mod: S$GLB,,, | Performed by: NURSE PRACTITIONER

## 2025-03-18 PROCEDURE — 1160F RVW MEDS BY RX/DR IN RCRD: CPT | Mod: CPTII,S$GLB,, | Performed by: NURSE PRACTITIONER

## 2025-03-18 PROCEDURE — 3074F SYST BP LT 130 MM HG: CPT | Mod: CPTII,S$GLB,, | Performed by: NURSE PRACTITIONER

## 2025-03-18 PROCEDURE — 99999 PR PBB SHADOW E&M-EST. PATIENT-LVL IV: CPT | Mod: PBBFAC,,, | Performed by: NURSE PRACTITIONER

## 2025-03-18 PROCEDURE — 1159F MED LIST DOCD IN RCRD: CPT | Mod: CPTII,S$GLB,, | Performed by: NURSE PRACTITIONER

## 2025-03-18 NOTE — PROGRESS NOTES
Date of service: 3/18/2025  Referring provider: No ref. provider found    Subjective:      Chief complaint: Headache       Patient ID: Kaci Handley is a 34 y.o. female with anxiety, chronic fatigue, hemangioma of liver, insomnia, pituitary adenoma, depression who presents for follow up of headache     History of Present Illness    INTERVAL HISTORY 3/18/24    Last visit was six months ago and at that time she was having 1-2 headaches every two weeks.    Today she reports she is doing well. Current pain 0 with range 1-10. She has 1-2 headache days every 2-3 weeks.. She takes Ubrelvy as needed. Otherwise information below is reviewed and verified with no changes made     INTERVAL HISTORY 9/30/24  The patient location is: work  The chief complaint leading to consultation is: follow up  Visit type: audiovisual  20 minutes of total time spent on the encounter, which includes face to face time and non-face to face time preparing to see the patient (eg, review of tests), Obtaining and/or reviewing separately obtained history, Documenting clinical information in the electronic or other health record, Independently interpreting results (not separately reported) and communicating results to the patient/family/caregiver, or Care coordination (not separately reported).   Each patient to whom he or she provides medical services by telemedicine is:  (1) informed of the relationship between the physician and patient and the respective role of any other health care provider with respect to management of the patient; and (2) notified that he or she may decline to receive medical services by telemedicine and may withdraw from such care at any time.    Notes:     Last visit was four months ago and at that time she was better.    Today she reports she is doing well. She reports maybe 1-2 headaches every two weeks. Current pain 0 with range 0-7. She takes Ubrelvy which is effective. If needed, the second dose will fully  abort migraines. Otherwise information below is reviewed and verified with no changes made    INTERVAL HISTORY 5/29/24    Last visit was about three months ago and at that time I referred for PT and sleep study. We started Avjoy and Ubrelvy.    Today she reports she is better. Current pain 0 with range 0-8. She has 1-2 headache days per week. She takes Ubrelvy 1-2 days per week. She stopped all OTC medications. No side effects to Ajovy. Otherwise information below is reviewed and verified with no changes made     ORIGINAL HEADACHE HISTORY - 2/6/24  Age at onset and course over time: 2009 began with near daily afternoon headaches. She would have to lay down and sleep for them to resolve. Headaches went away for a couple years. Then came back 3-4 years ago.  Family history of headaches - none  Last eye exam - 1 year ago  Location: forehead, neck, temples   Quality:  [] pressure [] tight [x] throbbing [] sharp [] stabbing   Severity: current 1 with range 1-8  Duration:  hours  Frequency: 3 days per week  Headaches awaken at night?: yes    Worst time of day:  mid-day, evening   Associated with: [x] photophobia [x]  phonophobia [x] osmophobia [] blurred vision  [] double vision [x] loss of appetite [x] nausea [x] vomiting [x] dizziness [] vertigo  [x] tinnitus [x] irritability [x] sinus pressure [] problems with concentration   [x] neck tightness   Alleviated by:  [] sleep [x] darkness [x] massage [] heat [] ice [] medication  Exacerbated by:  [x] fatigue [] light [] noise [x] smells [] coughing [] sneezing  [] bending over [] ovulation [x] menses [] alcohol [x] change in weather [x]  stress  Ipsilateral autonomic: [] nasal congestion [] lacrimation [] ptosis [] injection [] edema [] foreign body sensation [] ear fullness   ICP:  [] transient visual obscurations  [x] tinnitus low pitched, bilateral   [] positional headache  [x] non-positional   Sleep habits: trouble staying asleep, unrefreshing sleep, snoring   Caffeine  intake:  mg coffee in AM  Gyn status (if female): having periods, IUD  HIT 6: 66    Current acute treatment:  Zofran  Ubrelvy    Current prevention:  Cymbalta  Ajovy - first February 2024  Wellbutrin    Previously tried/failed acute treatment:  Tramadol  Naproxen  Flexeril  Sumatriptan  Nurtec - samples, sedating  Tylenol  ASA  Fioricet  Excedrin  Tylenol XS - daily   Ibuprofen - near daily for about 10 months   Rizatriptan - 3-4 days per week    Previously tried/failed preventative treatment:  Zyprexa  Fluoxetine  Trazodone  Topamax - 6-8 months with initial improvement then worsened   Amitriptyline - 6-8 months with initial improvement then worsened       Review of patient's allergies indicates:   Allergen Reactions    Covid-19 vac, bv (moderna)(pf) Other (See Comments)     Nausea, fever , myalgia     Current Outpatient Medications   Medication Sig Dispense Refill    buPROPion (WELLBUTRIN XL) 300 MG 24 hr tablet Take 1 tablet (300 mg total) by mouth once daily. 90 tablet 1    DULoxetine (CYMBALTA) 60 MG capsule Take 1 capsule (60 mg total) by mouth once daily. 90 capsule 1    dupilumab (DUPIXENT PEN) 300 mg/2 mL PnIj Inject 300 mg into the skin every 14 (fourteen) days.      fexofenadine (ALLEGRA) 180 MG tablet Take 180 mg by mouth every evening.      fremanezumab-vfrm (AJOVY AUTOINJECTOR) 225 mg/1.5 mL autoinjector Inject 1.5 mLs (225 mg total) into the skin every 28 days. 1 each 11    fremanezumab-vfrm (AJOVY AUTOINJECTOR) 225 mg/1.5 mL autoinjector Inject 225 mg into the skin every 28 days.      HYDROcodone-acetaminophen (NORCO)  mg per tablet Take 1 tablet by mouth every 4 (four) hours as needed (pain). 25 tablet 0    MAGNESIUM GLYCINATE ORAL Take 1 tablet by mouth every evening.      ondansetron (ZOFRAN-ODT) 8 MG TbDL Take 1 tablet (8 mg total) by mouth 3 (three) times daily as needed (nauea or vomiting). 30 tablet 2    ubrogepant (UBRELVY) 100 mg tablet TAKE 1 TABLET BY MOUTH AS NEEDED FOR  MIGRAINE. MAY REPEAT IN 2 HOURS IF NEEDED. MAX 2 TABLETS IN 24 HOURS 10 tablet 11     No current facility-administered medications for this visit.       Past Medical History  Past Medical History:   Diagnosis Date    Abdominal pain, RLQ (right lower quadrant) 03/04/2013    Abnormal Pap smear of vagina     Allergy     Anemia     Headache     History of 2019 novel coronavirus disease (COVID-19) 12/08/2020    Hyperglycemia     Pituitary adenoma     Thyroid disease     thyroid level fluctuate    Well female exam with routine gynecological exam 11/21/2012       Past Surgical History  Past Surgical History:   Procedure Laterality Date    COLONOSCOPY  06/30/2017    F Rabito    CYST REMOVAL  2013    left wrist-benign    ENDOSCOPIC NASAL SEPTOPLASTY Bilateral 12/28/2018    Procedure: SEPTOPLASTY, NOSE, ENDOSCOPIC;  Surgeon: Sam Robin MD;  Location: [facility];  Service: ENT;  Laterality: Bilateral;    ENDOSCOPIC NASAL SEPTOPLASTY  08/05/2020    EXCISION OF GANGLION OF WRIST Right 2/25/2025    Procedure: EXCISION, GANGLION CYST, WRIST;  Surgeon: Geoff Ng MD;  Location: [facility];  Service: Orthopedics;  Laterality: Right;    FUNCTIONAL ENDOSCOPIC SINUS SURGERY (FESS) USING COMPUTER-ASSISTED NAVIGATION Bilateral 12/28/2018    Procedure: FESS, USING COMPUTER-ASSISTED NAVIGATION;  Surgeon: Sam Robin MD;  Location: [facility] OR;  Service: ENT;  Laterality: Bilateral;    NASAL RECONSTRUCTION  08/05/2020    with graft    NASAL TURBINATE REDUCTION Bilateral 12/28/2018    Procedure: REDUCTION, NASAL TURBINATE;  Surgeon: Sam Robin MD;  Location: [facility];  Service: ENT;  Laterality: Bilateral;    NASAL TURBINATE REDUCTION  08/05/2020    POLYPECTOMY Left 02/28/2023    Procedure: POLYPECTOMY/ Nasal;  Surgeon: Eduardo Rivas MD;  Location: [facility];  Service: ENT;  Laterality: Left;    TONSILLECTOMY  1998    WISDOM TOOTH EXTRACTION  2009    WRIST MASS EXCISION Right 02/25/2025    excision of mass right wrist        Family History  Family History   Problem Relation Name Age of Onset    ADD / ADHD Mother      Bipolar disorder Mother      Allergies Mother      Drug abuse Mother      Retinal detachment Mother      Crohn's disease Father      Drug abuse Father      Alcohol abuse Father      No Known Problems Sister      Breast cancer Maternal Grandmother      Cancer Maternal Grandmother      Diabetes Maternal Grandfather      Diabetes Paternal Grandmother      COPD Paternal Grandfather      Ovarian cancer Neg Hx      Glaucoma Neg Hx      Macular degeneration Neg Hx         Social History  Social History     Socioeconomic History    Marital status:      Spouse name: Dallas Handley    Number of children: 0   Occupational History    Occupation: medical assistant     Employer: OTHER     Comment: [employer]   Tobacco Use    Smoking status: Former     Current packs/day: 0.00     Average packs/day: 0.3 packs/day for 3.0 years (0.8 ttl pk-yrs)     Types: Cigarettes     Start date: 10/21/2015     Quit date: 10/21/2018     Years since quittin.4    Smokeless tobacco: Never   Substance and Sexual Activity    Alcohol use: No    Drug use: Never    Sexual activity: Yes     Partners: Male     Birth control/protection: Condom, I.U.D.     Social Drivers of Health     Financial Resource Strain: Low Risk  (2024)    Overall Financial Resource Strain (CARDIA)     Difficulty of Paying Living Expenses: Not very hard   Food Insecurity: No Food Insecurity (2024)    Hunger Vital Sign     Worried About Running Out of Food in the Last Year: Never true     Ran Out of Food in the Last Year: Never true   Transportation Needs: No Transportation Needs (2024)    PRAPARE - Transportation     Lack of Transportation (Medical): No     Lack of Transportation (Non-Medical): No   Physical Activity: Sufficiently Active (2024)    Exercise Vital Sign     Days of Exercise per Week: 5 days     Minutes of Exercise per Session: 60 min   Stress:  Stress Concern Present (1/24/2024)    [employer] - Occupational Stress Questionnaire     Feeling of Stress : To some extent   Housing Stability: Low Risk  (1/24/2024)    Housing Stability Vital Sign     Unable to Pay for Housing in the Last Year: No     Number of Places Lived in the Last Year: 1     Unstable Housing in the Last Year: No        Objective:        Vitals:    03/18/25 1126   BP: 104/65   Pulse: 75   Resp: 17   Temp: 97.6 °F (36.4 °C)         Body mass index is 22.22 kg/m².    3/18/25  Constitutional:   She appears well-developed and well-nourished. She is well groomed     Neurological Exam:  General: well-developed, well-nourished, no distress  Mental status: Awake and alert  Speech language: No dysarthria or aphasia on conversation  Cranial nerves: Face symmetric  Motor: Moves all extremities well  Coordination: No ataxia. No tremor.      Data Review:     I have personally reviewed the referring provider's notes, labs, & imaging made available to me today.      RADIOLOGY STUDIES:  I have personally reviewed the pertinent images performed.       Results for orders placed or performed during the hospital encounter of 01/05/24   CT Head Without Contrast    Narrative    EXAMINATION:  CT HEAD WITHOUT CONTRAST    CLINICAL HISTORY:  Head trauma, moderate-severe;    TECHNIQUE:  Low dose axial CT images obtained throughout the head without intravenous contrast. Sagittal and coronal reconstructions were performed.    COMPARISON:  MRI 01/30/2021; CT 01/25/2021    FINDINGS:  Intracranial compartment:    Ventricles and sulci are normal in size for age without evidence of hydrocephalus. No extra-axial blood or fluid collections.    No acute parenchymal hemorrhage or evolving major vascular distribution infarct.  No intracranial mass effect or midline shift.    Skull/extracranial contents (limited evaluation): No fracture.  Partially visualized mucosal thickening in the right maxillary  antrum.  Mastoid air cells and remaining paranasal sinuses are essentially clear.      Impression    No acute intracranial abnormality.      Electronically signed by: Dmoo Belcher  Date:    01/05/2024  Time:    20:35       Lab Results   Component Value Date     10/01/2024    K 3.5 10/01/2024     10/01/2024    CO2 24 10/01/2024    BUN 17 10/01/2024    CREATININE 0.71 10/01/2024     10/01/2024    HGBA1C 4.9 03/07/2016    AST 20 10/01/2024    AST 16 03/07/2016    ALT 16 10/01/2024    ALBUMIN 4.6 10/01/2024    PROT 7.7 10/01/2024    BILITOT 0.4 10/01/2024    CHOL 170 12/15/2023    HDL 56 12/15/2023    LDLCALC 106.8 12/15/2023    TRIG 36 12/15/2023       Lab Results   Component Value Date    WBC 10.76 10/01/2024    HGB 13.0 10/01/2024    HCT 39.3 10/01/2024    MCV 89 10/01/2024     10/01/2024       Lab Results   Component Value Date    TSH 1.400 12/15/2023           Assessment & Plan:       Problem List Items Addressed This Visit          Neuro    Intractable migraine without aura and without status migrainosus - Primary    Overview   Migraine headaches since 2009. Headaches are typically unilateral, moderate to severe in intensity, worsen with activity, pounding in quality and associated with sensitivity to light, smell and sound. Gradual progression pattern, lack of red flag features on history, and normal neurological exam are reassuring for primary as opposed to secondary etiology of headaches thus imaging will not be pursued for this history and this exam at this time.    We discussed options for prevention. Previously failed Topamax and amitriptyline. Add CGRP. Avoid Aimovig and Qulipta due to baseline IBS with constipation. Start with Ajovy.    For acute attacks trial Ubrelvy. Discussed need to stop OTC.           Current Assessment & Plan   She has been on Ajovy for a little over a year with well over 50% improvement in migraine frequency. She does still get 1-2 migraines per month  indicated Ajovy is still needed. Continue Ubrelvy for acute attacks.                       Please call our clinic at [phone] or send a message on the [facility] portal if there are any changes to the plan described below, for example,if you are not contacted for the requested tests, referral(s) within one week, if you are unable to receive the medications prescribed, or if you feel you need to change the treatment course for any reason.     TESTING:  -- none     REFERRALS:  -- none     PREVENTION (use daily regardless of headache):  -- continue Ajovy once monthly  -- continue magnesium in ONE of the following preparations -               1. Magnesium oxide 800mg daily (the most common over the counter kind, may causes loose stools)              2. Magnesium citrate 400-500mg daily (harder to find, but more neutral on the bowels)              3. Magnesium glycinate 400mg daily (hardest to find, look online, but most bowel-neutral, best absorbed)     AS-NEEDED TREATMENT (use total no more than 10 days per month unless otherwise stated):  -- continue Ubrelvy with next migraine. You can repeat two hours later if needed. With this medication do not drink grapefruit juice or eat grapefruit or some medications like ketoconazole, itraconazole, or antibiotics clarithromycin  -- continue tizanidine at night. This is a muscle relaxer and it will also make you potentially sleepy. Start with half a tablet to see how you respond but can take up to a whole tablet if needed      Follow up in about 1 year (around 3/18/2026).    Maribel Wick NP

## 2025-03-18 NOTE — PATIENT INSTRUCTIONS
Please call our clinic at [phone] or send a message on the [email or web address] portal if there are any changes to the plan described below, for example,if you are not contacted for the requested tests, referral(s) within one week, if you are unable to receive the medications prescribed, or if you feel you need to change the treatment course for any reason.     TESTING:  -- none     REFERRALS:  -- none     PREVENTION (use daily regardless of headache):  -- continue Ajovy once monthly  -- continue magnesium in ONE of the following preparations -               1. Magnesium oxide 800mg daily (the most common over the counter kind, may causes loose stools)              2. Magnesium citrate 400-500mg daily (harder to find, but more neutral on the bowels)              3. Magnesium glycinate 400mg daily (hardest to find, look online, but most bowel-neutral, best absorbed)     AS-NEEDED TREATMENT (use total no more than 10 days per month unless otherwise stated):  -- continue Ubrelvy with next migraine. You can repeat two hours later if needed. With this medication do not drink grapefruit juice or eat grapefruit or some medications like ketoconazole, itraconazole, or antibiotics clarithromycin  -- continue tizanidine at night. This is a muscle relaxer and it will also make you potentially sleepy. Start with half a tablet to see how you respond but can take up to a whole tablet if needed

## 2025-03-18 NOTE — ASSESSMENT & PLAN NOTE
She has been on Ajovy for a little over a year with well over 50% improvement in migraine frequency. She does still get 1-2 migraines per month indicated Ajovy is still needed. Continue Ubrelvy for acute attacks.

## 2025-03-19 ENCOUNTER — TELEPHONE (OUTPATIENT)
Dept: NEUROLOGY | Facility: CLINIC | Age: 35
End: 2025-03-19
Payer: COMMERCIAL

## 2025-03-28 PROBLEM — D35.2 PITUITARY ADENOMA: Chronic | Status: RESOLVED | Noted: 2019-03-29 | Resolved: 2025-03-28

## 2025-04-04 ENCOUNTER — OFFICE VISIT (OUTPATIENT)
Dept: OPTOMETRY | Facility: CLINIC | Age: 35
End: 2025-04-04
Payer: COMMERCIAL

## 2025-04-04 DIAGNOSIS — D35.2 PITUITARY ADENOMA: ICD-10-CM

## 2025-04-04 DIAGNOSIS — Z83.518 FAMILY HISTORY OF RETINAL DETACHMENT: ICD-10-CM

## 2025-04-04 DIAGNOSIS — H52.203 MYOPIA OF BOTH EYES WITH ASTIGMATISM: Primary | ICD-10-CM

## 2025-04-04 DIAGNOSIS — H52.13 MYOPIA OF BOTH EYES WITH ASTIGMATISM: Primary | ICD-10-CM

## 2025-04-04 PROCEDURE — 99999 PR PBB SHADOW E&M-EST. PATIENT-LVL III: CPT | Mod: PBBFAC,,, | Performed by: OPTOMETRIST

## 2025-04-04 NOTE — PROGRESS NOTES
HPI     Contact Lens Fit            Comments: OD AVaira Vitality toric  -3.75 -1.75 100   OS AVaira Vitality toric  -3.75 -1.25 080              Comments    Pt states : here for vinh orta 02/16/2024  Pt feels vision w cl & glasses is stable does not notice a change   Bio true afts prn  Denies F/F           Last edited by Erica Patiño on 4/4/2025  3:44 PM.            Assessment /Plan     For exam results, see Encounter Report.    Myopia of both eyes with astigmatism    Family history of retinal detachment    Pituitary adenoma      Contact lens Rx released to pt. Daily wear only advised, with education to risks of extended wear.  Discussed lens care, compliance and solutions. RTC yearly contact lens follow up.   2. Optos normal, RTC yearly with dfe  3. No periph vision compliants, due for scan with endo.